# Patient Record
Sex: FEMALE | Race: WHITE | NOT HISPANIC OR LATINO | Employment: PART TIME | URBAN - METROPOLITAN AREA
[De-identification: names, ages, dates, MRNs, and addresses within clinical notes are randomized per-mention and may not be internally consistent; named-entity substitution may affect disease eponyms.]

---

## 2017-03-27 ENCOUNTER — OFFICE VISIT (OUTPATIENT)
Dept: URGENT CARE | Facility: URGENT CARE | Age: 52
End: 2017-03-27
Payer: COMMERCIAL

## 2017-03-27 ENCOUNTER — RADIANT APPOINTMENT (OUTPATIENT)
Dept: GENERAL RADIOLOGY | Facility: CLINIC | Age: 52
End: 2017-03-27
Attending: FAMILY MEDICINE
Payer: COMMERCIAL

## 2017-03-27 VITALS
HEART RATE: 88 BPM | SYSTOLIC BLOOD PRESSURE: 100 MMHG | WEIGHT: 139.3 LBS | TEMPERATURE: 98.2 F | BODY MASS INDEX: 28.12 KG/M2 | DIASTOLIC BLOOD PRESSURE: 64 MMHG | RESPIRATION RATE: 16 BRPM | OXYGEN SATURATION: 98 %

## 2017-03-27 DIAGNOSIS — R07.9 CHEST PAIN, UNSPECIFIED: ICD-10-CM

## 2017-03-27 DIAGNOSIS — J18.9 PNEUMONIA OF LEFT LOWER LOBE DUE TO INFECTIOUS ORGANISM: ICD-10-CM

## 2017-03-27 DIAGNOSIS — R05.9 COUGH: Primary | ICD-10-CM

## 2017-03-27 DIAGNOSIS — R51.9 NONINTRACTABLE HEADACHE, UNSPECIFIED CHRONICITY PATTERN, UNSPECIFIED HEADACHE TYPE: ICD-10-CM

## 2017-03-27 PROCEDURE — 71020 XR CHEST 2 VW: CPT

## 2017-03-27 PROCEDURE — 99214 OFFICE O/P EST MOD 30 MIN: CPT | Performed by: FAMILY MEDICINE

## 2017-03-27 RX ORDER — AZITHROMYCIN 250 MG/1
TABLET, FILM COATED ORAL
Qty: 6 TABLET | Refills: 0 | Status: SHIPPED | OUTPATIENT
Start: 2017-03-27 | End: 2018-07-18

## 2017-03-27 RX ORDER — CEFTRIAXONE 1 G/1
1000 INJECTION, POWDER, FOR SOLUTION INTRAMUSCULAR; INTRAVENOUS ONCE
Qty: 10 ML | Refills: 0
Start: 2017-03-27 | End: 2017-03-27

## 2017-03-27 NOTE — LETTER
[facility]   [address] [place] [address]   [phone]                March 27, 2017    RE:  Carlotta Arce                                                                                                                                                       [address] [facility] [address]            To whom it may concern:    Carlotta J Iván Arce is under my professional care for current illness. Carlotta  may return to work and resume regular physical activity.    Sincerely,        Aracely Beckham MD

## 2017-03-27 NOTE — NURSING NOTE
"Chief Complaint   Patient presents with     Cough     Cough and headache x 1 week      Diarrhea     x yesterday but not today        Initial /64 (BP Location: Left arm, Patient Position: Chair, Cuff Size: Adult Regular)  Pulse 88  Temp 98.2  F (36.8  C) (Oral)  Resp 16  Wt 139 lb 4.8 oz (63.2 kg)  SpO2 98%  BMI 28.12 kg/m2 Estimated body mass index is 28.12 kg/(m^2) as calculated from the following:    Height as of 8/31/14: 4' 11.02\" (1.499 m).    Weight as of this encounter: 139 lb 4.8 oz (63.2 kg).  Medication Reconciliation: complete    "

## 2017-03-27 NOTE — PROGRESS NOTES
Chief Complaint   Patient presents with     Cough     Cough and headache x 1 week      Diarrhea     x yesterday but not today      SUBJECTIVE:  Carlotta Arce is a 51 year old female who presents to the clinic today with a chief complaint of cough  and wheezing. for 1 week(s).  Her cough is described as persistent and productive of yellow sputum.    The patient's symptoms are moderate and not changing over the course of time.  Associated symptoms include chest pain with coughing . The patient's symptoms are exacerbated by no particular triggers  Patient has been using albuterol inhalor to improve symptoms.she has been smoking   She had one episode of diarrhea yesterday and also has been having chills too   Has chest pain in the center of the chest with coughing   She has been noticing headache with the coughing too  Denies any neurological symptoms with the headache     Past Medical History:   Diagnosis Date     Allergy, unspecified not elsewhere classified      Moderate persistent asthma      Other kyphoscoliosis and scoliosis      Tobacco use disorder        Current Outpatient Prescriptions   Medication Sig Dispense Refill     azithromycin (ZITHROMAX) 250 MG tablet Two tablets first day, then one tablet daily for four days. 6 tablet 0     cefTRIAXone (ROCEPHIN) 1 GM vial Inject 1 g (1,000 mg) into the muscle once for 1 dose 10 mL 0     IBUPROFEN PO        albuterol (2.5 MG/3ML) 0.083% nebulizer solution Take 1 vial (2.5 mg) by nebulization every 4 hours as needed for shortness of breath / dyspnea 1 Box 0     albuterol (ALBUTEROL) 108 (90 BASE) MCG/ACT inhaler Inhale 2 puffs into the lungs every 4 hours as needed for shortness of breath / dyspnea 1 Inhaler 0       Social History   Substance Use Topics     Smoking status: Current Every Day Smoker     Packs/day: 0.50     Years: 21.00     Types: Cigarettes     Smokeless tobacco: Never Used     Alcohol use Yes      Comment: 1-2x/wk       ROS  Review of systems  negative except as stated above.    OBJECTIVE:  /64 (BP Location: Left arm, Patient Position: Chair, Cuff Size: Adult Regular)  Pulse 88  Temp 98.2  F (36.8  C) (Oral)  Resp 16  Wt 139 lb 4.8 oz (63.2 kg)  SpO2 98%  BMI 28.12 kg/m2  GENERAL APPEARANCE: healthy, alert and no distress  EYES: EOMI,  PERRL, conjunctiva clear  HENT: ear canals and TM's normal.  Nose and mouth without ulcers, erythema or lesions  NECK: supple, nontender, no lymphadenopathy  RESP: good air entry positive for  rales, no rhonchi or wheezes  CV: regular rates and rhythm  ABDOMEN:  soft, nontender, no HSM or masses and bowel sounds normal  SKIN: no suspicious lesions or rashes    ASSESSMENT:   Carlotta was seen today for cough and diarrhea.    Diagnoses and all orders for this visit:    Cough  -     XR Chest 2 Views    Nonintractable headache, unspecified chronicity pattern, unspecified headache type    Chest pain, unspecified    Pneumonia of left lower lobe due to infectious organism  -     azithromycin (ZITHROMAX) 250 MG tablet; Two tablets first day, then one tablet daily for four days.  -     cefTRIAXone (ROCEPHIN) 1 GM vial; Inject 1 g (1,000 mg) into the muscle once for 1 dose        PLAN:  See orders in Epic  Symptomatic measures encouraged, humidified air, plenty of fluids.  Discussed with pt about the results  Advised to continue the meds   Discussed about smoking   She was advised to follow up with PCP in 2-3 days   Follow up if  symptoms fail to improve or worsens   Pt understood and agreed with plan

## 2017-03-27 NOTE — MR AVS SNAPSHOT
"              After Visit Summary   3/27/2017    Carlotta Arce    MRN: [id number]           Patient Information     Date Of Birth          1965        Visit Information        Provider Department      3/27/2017 5:45 PM Aracely Beckham MD [facility]        Today's Diagnoses     Cough    -  1    Nonintractable headache, unspecified chronicity pattern, unspecified headache type        Chest pain, unspecified        Pneumonia of left lower lobe due to infectious organism           Follow-ups after your visit        Who to contact     If you have questions or need follow up information about today's clinic visit or your schedule please contact [facility] directly at [phone].  Normal or non-critical lab and imaging results will be communicated to you by MyChart, letter or phone within 4 business days after the clinic has received the results. If you do not hear from us within 7 days, please contact the clinic through MyChart or phone. If you have a critical or abnormal lab result, we will notify you by phone as soon as possible.  Submit refill requests through airpim or call your pharmacy and they will forward the refill request to us. Please allow 3 business days for your refill to be completed.          Additional Information About Your Visit        MyChart Information     airpim lets you send messages to your doctor, view your test results, renew your prescriptions, schedule appointments and more. To sign up, go to [email or web address] . Click on \"Log in\" on the left side of the screen, which will take you to the Welcome page. Then click on \"Sign up Now\" on the right side of the page.     You will be asked to enter the access code listed below, as well as some personal information. Please follow the directions to create your username and password.     Your access code is: [email or web address]  Expires: 6/25/2017  8:44 PM     Your access code " will  in 90 days. If you need help or a new code, please call your [facility] or [phone].        Care EveryWhere ID     This is your Care EveryWhere ID. This could be used by other organizations to access your [place] medical records  [id number]        Your Vitals Were     Pulse Temperature Respirations Pulse Oximetry BMI (Body Mass Index)       88 98.2  F (36.8  C) (Oral) 16 98% 28.12 kg/m2        Blood Pressure from Last 3 Encounters:   17 100/64   04/12/15 98/64   14 114/63    Weight from Last 3 Encounters:   17 139 lb 4.8 oz (63.2 kg)   04/12/15 156 lb (70.8 kg)   14 151 lb (68.5 kg)              We Performed the Following     XR Chest 2 Views          Today's Medication Changes          These changes are accurate as of: 3/27/17  8:44 PM.  If you have any questions, ask your nurse or doctor.               Start taking these medicines.        Dose/Directions    azithromycin 250 MG tablet   Commonly known as:  ZITHROMAX   Used for:  Pneumonia of left lower lobe due to infectious organism   Started by:  Aracely Beckham MD        Two tablets first day, then one tablet daily for four days.   Quantity:  6 tablet   Refills:  0       cefTRIAXone 1 GM vial   Commonly known as:  ROCEPHIN   Used for:  Pneumonia of left lower lobe due to infectious organism   Started by:  Aracely Beckham MD        Dose:  1000 mg   Inject 1 g (1,000 mg) into the muscle once for 1 dose   Quantity:  10 mL   Refills:  0            Where to get your medicines      Some of these will need a paper prescription and others can be bought over the counter.  Ask your nurse if you have questions.     Bring a paper prescription for each of these medications     azithromycin 250 MG tablet       You don't need a prescription for these medications     cefTRIAXone 1 GM vial                Primary Care Provider Office Phone # Fax #    Casey Gottlieb [phone]       [facility] [address] [place] [address]        Thank you!     Thank you for choosing [facility]  for your care. Our goal is always to provide you with excellent care. Hearing back from our patients is one way we can continue to improve our services. Please take a few minutes to complete the written survey that you may receive in the mail after your visit with us. Thank you!             Your Updated Medication List - Protect others around you: Learn how to safely use, store and throw away your medicines at www.disposemymeds.org.          This list is accurate as of: 3/27/17  8:44 PM.  Always use your most recent med list.                   Brand Name Dispense Instructions for use    * albuterol 108 (90 BASE) MCG/ACT Inhaler    albuterol    1 Inhaler    Inhale 2 puffs into the lungs every 4 hours as needed for shortness of breath / dyspnea       * albuterol (2.5 MG/3ML) 0.083% neb solution     1 Box    Take 1 vial (2.5 mg) by nebulization every 4 hours as needed for shortness of breath / dyspnea       azithromycin 250 MG tablet    ZITHROMAX    6 tablet    Two tablets first day, then one tablet daily for four days.       cefTRIAXone 1 GM vial    ROCEPHIN    10 mL    Inject 1 g (1,000 mg) into the muscle once for 1 dose       IBUPROFEN PO          * Notice:  This list has 2 medication(s) that are the same as other medications prescribed for you. Read the directions carefully, and ask your doctor or other care provider to review them with you.

## 2017-03-28 NOTE — NURSING NOTE
Called patient and informed her to follow up with her PCP in 2-3 days pre providers request. Patient had no further questions.

## 2017-07-01 ENCOUNTER — HEALTH MAINTENANCE LETTER (OUTPATIENT)
Age: 52
End: 2017-07-01

## 2018-07-18 ENCOUNTER — OFFICE VISIT (OUTPATIENT)
Dept: URGENT CARE | Facility: URGENT CARE | Age: 53
End: 2018-07-18
Payer: COMMERCIAL

## 2018-07-18 VITALS
BODY MASS INDEX: 29.57 KG/M2 | RESPIRATION RATE: 18 BRPM | OXYGEN SATURATION: 94 % | WEIGHT: 146.5 LBS | TEMPERATURE: 97.9 F | HEART RATE: 85 BPM | SYSTOLIC BLOOD PRESSURE: 126 MMHG | DIASTOLIC BLOOD PRESSURE: 80 MMHG

## 2018-07-18 DIAGNOSIS — J45.41 MODERATE PERSISTENT ASTHMA WITH ACUTE EXACERBATION: Primary | ICD-10-CM

## 2018-07-18 PROCEDURE — 99214 OFFICE O/P EST MOD 30 MIN: CPT | Performed by: FAMILY MEDICINE

## 2018-07-18 RX ORDER — METHYLPREDNISOLONE 4 MG
4 TABLET, DOSE PACK ORAL SEE ADMIN INSTRUCTIONS
Qty: 21 TABLET | Refills: 0 | Status: SHIPPED | OUTPATIENT
Start: 2018-07-18 | End: 2019-02-05

## 2018-07-18 RX ORDER — AZITHROMYCIN 250 MG/1
TABLET, FILM COATED ORAL
Qty: 6 TABLET | Refills: 0 | Status: SHIPPED | OUTPATIENT
Start: 2018-07-18 | End: 2019-02-05

## 2018-07-18 NOTE — MR AVS SNAPSHOT
After Visit Summary   7/18/2018    Carlotta Arce    MRN: [id number]           Patient Information     Date Of Birth          1965        Visit Information        Provider Department      7/18/2018 1:20 PM Nida Hobson MD [facility]        Today's Diagnoses     Moderate persistent asthma with acute exacerbation    -  1      Care Instructions      Asthma (Adult)  Asthma is a disease where the medium and  small air passages within the lung go into spasm and restrict the flow of air. Inflammation and swelling of the airways cause further blockage. During an acute asthma attack, these factors cause trouble breathing, wheezing, cough and chest tightness.    An asthma attack can be triggered by many things. Common triggers include infections such as the common cold, bronchitis, and pneumonia. Irritants such as smoke or pollutants in the air, very cold air, emotional upset, and exercise can also trigger an attack. In many adults with asthma, allergies to dust, mold, pollen and animal dander can cause an asthma attack. Skipping doses of daily asthma medicine can also bring on an asthma attack.  Asthma can be controlled using the proper medicines prescribed by your healthcare provider and avoiding exposure to known triggers including allergens and irritants.  Home care    Take prescribed medicine exactly at the times advised. If you need medicine such as from a hand held inhaler or aerosol breathing machine more than every 4 hours, contact your healthcare provider or seek immediate medical attention. If prescribed an antibiotic or prednisone, take all of the medicine as prescribed, even if you are feeling better after a few days.    Don't smoke. Avoid being exposed to the smoke of others.    Some people with asthma have worsening of their symptoms when they take aspirin and non-steroidal or fever-reducing medicines like ibuprofen and naproxen. Talk to your  "healthcare provider if you think this may apply to you.  Follow-up care  Follow up with your healthcare provider, or as advised. Always bring all of your current medicines to any appointments with your healthcare provider. Also bring a complete list of medicines even those not taken for asthma. If you don't already have one, talk to your healthcare provider about developing your own \"Asthma Action Plan.\"  A pneumococcal (pneumonia) vaccine and yearly flu shot (every fall) are recommended. Ask your doctor about this.  When to seek medical advice  Call your healthcare provider right away if any of these occur:     Increased wheezing or shortness of breath    Need to use your inhalers more often than usual without relief    Fever of 100.4 F (38 C) or higher, or as directed by your healthcare provider    Coughing up lots of dark-colored or bloody sputum (mucus)    Chest pain with each breath    If you use a peak flow meter as part of an Asthma Action Plan, and you are still in the yellow zone (50% to 80%) 15 minutes after using inhaler medicine.  Call 911  Call 911 if any of the following occur    Trouble walking or talking because of shortness of breath    If you use a peak flow meter as part of an Asthma Action Plan and you are still in the red zone (less than 50%) 15 minutes after using inhaler medicine    Lips or fingernails turning gray or blue  Date Last Reviewed: 5/1/2017 2000-2017 [employer]. [address] [place] [address]. All rights reserved. This information is not intended as a substitute for professional medical care. Always follow your healthcare professional's instructions.                Follow-ups after your visit        Who to contact     If you have questions or need follow up information about today's clinic visit or your schedule please contact [place] [facility] directly at [phone].  Normal or non-critical lab and imaging results will be " "communicated to you by [employer], letter or phone within 4 business days after the clinic has received the results. If you do not hear from us within 7 days, please contact the clinic through [employer] or phone. If you have a critical or abnormal lab result, we will notify you by phone as soon as possible.  Submit refill requests through [employer] or call your pharmacy and they will forward the refill request to us. Please allow 3 business days for your refill to be completed.          Additional Information About Your Visit        Veduca Information     [employer]a lets you send messages to your doctor, view your test results, renew your prescriptions, schedule appointments and more. To sign up, go to [email or web address] . Click on \"Log in\" on the left side of the screen, which will take you to the Welcome page. Then click on \"Sign up Now\" on the right side of the page.     You will be asked to enter the access code listed below, as well as some personal information. Please follow the directions to create your username and password.     Your access code is: [email or web address]  Expires: 10/16/2018  2:05 PM     Your access code will  in 90 days. If you need help or a new code, please call your [place] clinic or [phone].        Care EveryWhere ID     This is your Care EveryWhere ID. This could be used by other organizations to access your [place] medical records  [id number]        Your Vitals Were     Pulse Temperature Respirations Pulse Oximetry BMI (Body Mass Index)       85 97.9  F (36.6  C) (Oral) 18 94% 29.57 kg/m2        Blood Pressure from Last 3 Encounters:   18 126/80   17 100/64   04/12/15 98/64    Weight from Last 3 Encounters:   18 146 lb 8 oz (66.5 kg)   17 139 lb 4.8 oz (63.2 kg)   04/12/15 156 lb (70.8 kg)              Today, you had the following     No orders found for display         Today's Medication Changes          These changes are accurate as of 18  2:05 PM.  If " you have any questions, ask your nurse or doctor.               Start taking these medicines.        Dose/Directions    methylPREDNISolone 4 MG tablet   Commonly known as:  MEDROL   Used for:  Moderate persistent asthma with acute exacerbation   Started by:  Nida Hobson MD        Dose:  4 mg   Take 1 tablet (4 mg) by mouth See Admin Instructions follow package directions   Quantity:  21 tablet   Refills:  0         These medicines have changed or have updated prescriptions.        Dose/Directions    azithromycin 250 MG tablet   Commonly known as:  ZITHROMAX   This may have changed:  additional instructions   Used for:  Moderate persistent asthma with acute exacerbation   Changed by:  Nida Hobson MD        2 tablets day 1 then 1 tablet daily for 4 days   Quantity:  6 tablet   Refills:  0         Stop taking these medicines if you haven't already. Please contact your care team if you have questions.     IBUPROFEN PO   Stopped by:  Nida Hobson MD                Where to get your medicines      These medications were sent to "[facility] [address] [place] [address] [address] [facility] [place] [address] [address] [facility] [address]     Phone:  [phone]     azithromycin 250 MG tablet    methylPREDNISolone 4 MG tablet                Primary Care Provider Office Phone # Fax #    Casey Gottlieb [phone]       [address] [address]        Equal Access to Services     ALVARO HERRING : Hadii fran patel hadasho Soomaali, waaxda luqadaha, qaybta kaalmada adeegyada, katie leija. So [facility] [phone].    ATENCIÓN: Si habla español, tiene a wolff disposición servicios gratuitos de asistencia lingüística. Llame al [phone].    We comply with applicable federal civil rights laws and [place] laws. We do not discriminate on the basis of race, color, national origin, age, disability, sex, sexual orientation, or  gender identity.            Thank you!     Thank you for choosing [facility]  for your care. Our goal is always to provide you with excellent care. Hearing back from our patients is one way we can continue to improve our services. Please take a few minutes to complete the written survey that you may receive in the mail after your visit with us. Thank you!             Your Updated Medication List - Protect others around you: Learn how to safely use, store and throw away your medicines at www.disposemymeds.org.          This list is accurate as of 7/18/18  2:05 PM.  Always use your most recent med list.                   Brand Name Dispense Instructions for use Diagnosis    * albuterol 108 (90 Base) MCG/ACT Inhaler    PROAIR HFA    1 Inhaler    Inhale 2 puffs into the lungs every 4 hours as needed for shortness of breath / dyspnea        * albuterol (2.5 MG/3ML) 0.083% neb solution     1 Box    Take 1 vial (2.5 mg) by nebulization every 4 hours as needed for shortness of breath / dyspnea    Moderate persistent asthma       azithromycin 250 MG tablet    ZITHROMAX    6 tablet    2 tablets day 1 then 1 tablet daily for 4 days    Moderate persistent asthma with acute exacerbation       methylPREDNISolone 4 MG tablet    MEDROL    21 tablet    Take 1 tablet (4 mg) by mouth See Admin Instructions follow package directions    Moderate persistent asthma with acute exacerbation       * Notice:  This list has 2 medication(s) that are the same as other medications prescribed for you. Read the directions carefully, and ask your doctor or other care provider to review them with you.

## 2018-07-18 NOTE — PATIENT INSTRUCTIONS
"  Asthma (Adult)  Asthma is a disease where the medium and  small air passages within the lung go into spasm and restrict the flow of air. Inflammation and swelling of the airways cause further blockage. During an acute asthma attack, these factors cause trouble breathing, wheezing, cough and chest tightness.    An asthma attack can be triggered by many things. Common triggers include infections such as the common cold, bronchitis, and pneumonia. Irritants such as smoke or pollutants in the air, very cold air, emotional upset, and exercise can also trigger an attack. In many adults with asthma, allergies to dust, mold, pollen and animal dander can cause an asthma attack. Skipping doses of daily asthma medicine can also bring on an asthma attack.  Asthma can be controlled using the proper medicines prescribed by your healthcare provider and avoiding exposure to known triggers including allergens and irritants.  Home care    Take prescribed medicine exactly at the times advised. If you need medicine such as from a hand held inhaler or aerosol breathing machine more than every 4 hours, contact your healthcare provider or seek immediate medical attention. If prescribed an antibiotic or prednisone, take all of the medicine as prescribed, even if you are feeling better after a few days.    Don't smoke. Avoid being exposed to the smoke of others.    Some people with asthma have worsening of their symptoms when they take aspirin and non-steroidal or fever-reducing medicines like ibuprofen and naproxen. Talk to your healthcare provider if you think this may apply to you.  Follow-up care  Follow up with your healthcare provider, or as advised. Always bring all of your current medicines to any appointments with your healthcare provider. Also bring a complete list of medicines even those not taken for asthma. If you don't already have one, talk to your healthcare provider about developing your own \"Asthma Action Plan.\"  A " pneumococcal (pneumonia) vaccine and yearly flu shot (every fall) are recommended. Ask your doctor about this.  When to seek medical advice  Call your healthcare provider right away if any of these occur:     Increased wheezing or shortness of breath    Need to use your inhalers more often than usual without relief    Fever of 100.4 F (38 C) or higher, or as directed by your healthcare provider    Coughing up lots of dark-colored or bloody sputum (mucus)    Chest pain with each breath    If you use a peak flow meter as part of an Asthma Action Plan, and you are still in the yellow zone (50% to 80%) 15 minutes after using inhaler medicine.  Call 911  Call 911 if any of the following occur    Trouble walking or talking because of shortness of breath    If you use a peak flow meter as part of an Asthma Action Plan and you are still in the red zone (less than 50%) 15 minutes after using inhaler medicine    Lips or fingernails turning gray or blue  Date Last Reviewed: 5/1/2017 2000-2017 [employer]. [address] [place] [address], [place] [address]. All rights reserved. This information is not intended as a substitute for professional medical care. Always follow your healthcare professional's instructions.

## 2018-07-18 NOTE — LETTER
[facility]  [address] [address]  [phone]      July 18, 2018    RE:  Carlotta Arce                                                                                                                                                       [address] [facility] [address]            To whom it may concern:    Carlotta Arce is under my professional care for Moderate persistent asthma with acute exacerbation.   She  may return to work with the following: No working or lifting restrictions on or about 7/19/2018.          Sincerely,        Nida Hobson MD  [facility]

## 2019-02-04 ENCOUNTER — NURSE TRIAGE (OUTPATIENT)
Dept: NURSING | Facility: CLINIC | Age: 54
End: 2019-02-04

## 2019-02-04 NOTE — TELEPHONE ENCOUNTER
"Carlotta says that she has had the diarrhea, since last Wednesday, 1/30/2019. Pt. Says that she vomited yesterday and has had abdominal pain, right below her belly button. Pain = 7/10. Pt. Will have her  take her to an ER tonight.    Reason for Disposition    [1] Vomiting AND [2] abdomen looks much more swollen than usual    Additional Information    Negative: Shock suspected (e.g., cold/pale/clammy skin, too weak to stand, low BP, rapid pulse)    Negative: Difficult to awaken or acting confused  (e.g., disoriented, slurred speech)    Negative: Passed out (i.e., lost consciousness, collapsed and was not responding)    Negative: Sounds like a life-threatening emergency to the triager    Negative: Chest pain    Negative: Pain is mainly in upper abdomen  (if needed ask: \"is it mainly above the belly button?\")    Negative: Followed an abdomen (stomach) injury    Negative: [1] Abdominal pain AND [2] pregnant < 20 weeks    Negative: [1] Abdominal pain AND [2] pregnant > 20 weeks    Negative: [1] Abdominal pain AND [2] postpartum < 1 month since delivery    Negative: [1] SEVERE pain (e.g., excruciating) AND [2] present > 1 hour    Negative: [1] SEVERE pain AND [2] age > 60    Negative: [1] Vomiting AND [2] contains red blood or black (\"coffee ground\") material  (Exception: few red streaks in vomit that only happened once)    Negative: Blood in bowel movements   (Exception: blood on surface of BM with constipation)    Negative: Black or tarry bowel movements  (Exception: chronic-unchanged  black-grey bowel movements AND is taking iron pills or Pepto-bismol)    Negative: Patient sounds very sick or weak to the triager    Negative: [1] MILD-MODERATE pain AND [2] constant AND [3] present > 2 hours    Protocols used: ABDOMINAL PAIN - FEMALE-ADULT-      "

## 2019-02-05 ENCOUNTER — HOSPITAL ENCOUNTER (EMERGENCY)
Facility: CLINIC | Age: 54
Discharge: HOME | End: 2019-02-05
Attending: EMERGENCY MEDICINE | Admitting: EMERGENCY MEDICINE
Payer: MEDICAID

## 2019-02-05 VITALS
WEIGHT: 155 LBS | DIASTOLIC BLOOD PRESSURE: 74 MMHG | TEMPERATURE: 98.2 F | HEIGHT: 59 IN | RESPIRATION RATE: 26 BRPM | BODY MASS INDEX: 31.25 KG/M2 | OXYGEN SATURATION: 97 % | SYSTOLIC BLOOD PRESSURE: 121 MMHG

## 2019-02-05 DIAGNOSIS — J45.901 EXACERBATION OF ASTHMA, UNSPECIFIED ASTHMA SEVERITY, UNSPECIFIED WHETHER PERSISTENT: ICD-10-CM

## 2019-02-05 DIAGNOSIS — K52.9 GASTROENTERITIS: ICD-10-CM

## 2019-02-05 LAB
ANION GAP SERPL CALCULATED.3IONS-SCNC: 7 MMOL/L (ref 3–14)
ANISOCYTOSIS BLD QL SMEAR: ABNORMAL
BASOPHILS # BLD AUTO: 0.1 10E9/L (ref 0–0.2)
BASOPHILS NFR BLD AUTO: 1.1 %
BUN SERPL-MCNC: 12 MG/DL (ref 7–30)
CALCIUM SERPL-MCNC: 8.8 MG/DL (ref 8.5–10.1)
CHLORIDE SERPL-SCNC: 105 MMOL/L (ref 94–109)
CO2 SERPL-SCNC: 30 MMOL/L (ref 20–32)
CREAT SERPL-MCNC: 0.51 MG/DL (ref 0.52–1.04)
DIFFERENTIAL METHOD BLD: ABNORMAL
EOSINOPHIL # BLD AUTO: 0.1 10E9/L (ref 0–0.7)
EOSINOPHIL NFR BLD AUTO: 2.2 %
ERYTHROCYTE [DISTWIDTH] IN BLOOD BY AUTOMATED COUNT: 18.9 % (ref 10–15)
GFR SERPL CREATININE-BSD FRML MDRD: >90 ML/MIN/{1.73_M2}
GLUCOSE SERPL-MCNC: 96 MG/DL (ref 70–99)
HCT VFR BLD AUTO: 30.4 % (ref 35–47)
HGB BLD-MCNC: 8.6 G/DL (ref 11.7–15.7)
HYPOCHROMIA BLD QL: PRESENT
IMM GRANULOCYTES # BLD: 0 10E9/L (ref 0–0.4)
IMM GRANULOCYTES NFR BLD: 0.5 %
LYMPHOCYTES # BLD AUTO: 2.2 10E9/L (ref 0.8–5.3)
LYMPHOCYTES NFR BLD AUTO: 34.7 %
MCH RBC QN AUTO: 18.9 PG (ref 26.5–33)
MCHC RBC AUTO-ENTMCNC: 28.3 G/DL (ref 31.5–36.5)
MCV RBC AUTO: 67 FL (ref 78–100)
MICROCYTES BLD QL SMEAR: PRESENT
MONOCYTES # BLD AUTO: 0.5 10E9/L (ref 0–1.3)
MONOCYTES NFR BLD AUTO: 8.3 %
NEUTROPHILS # BLD AUTO: 3.4 10E9/L (ref 1.6–8.3)
NEUTROPHILS NFR BLD AUTO: 53.2 %
NRBC # BLD AUTO: 0 10*3/UL
NRBC BLD AUTO-RTO: 0 /100
PLATELET # BLD AUTO: 492 10E9/L (ref 150–450)
PLATELET # BLD EST: ABNORMAL 10*3/UL
POTASSIUM SERPL-SCNC: 3.7 MMOL/L (ref 3.4–5.3)
RBC # BLD AUTO: 4.55 10E12/L (ref 3.8–5.2)
SODIUM SERPL-SCNC: 142 MMOL/L (ref 133–144)
WBC # BLD AUTO: 6.4 10E9/L (ref 4–11)

## 2019-02-05 PROCEDURE — 25000132 ZZH RX MED GY IP 250 OP 250 PS 637: Performed by: EMERGENCY MEDICINE

## 2019-02-05 PROCEDURE — 99284 EMERGENCY DEPT VISIT MOD MDM: CPT | Mod: 25

## 2019-02-05 PROCEDURE — 80048 BASIC METABOLIC PNL TOTAL CA: CPT | Performed by: EMERGENCY MEDICINE

## 2019-02-05 PROCEDURE — 25000128 H RX IP 250 OP 636: Performed by: EMERGENCY MEDICINE

## 2019-02-05 PROCEDURE — 96374 THER/PROPH/DIAG INJ IV PUSH: CPT

## 2019-02-05 PROCEDURE — 85025 COMPLETE CBC W/AUTO DIFF WBC: CPT | Performed by: EMERGENCY MEDICINE

## 2019-02-05 PROCEDURE — 96361 HYDRATE IV INFUSION ADD-ON: CPT

## 2019-02-05 PROCEDURE — 25000125 ZZHC RX 250: Performed by: EMERGENCY MEDICINE

## 2019-02-05 RX ORDER — ONDANSETRON 4 MG/1
4 TABLET, ORALLY DISINTEGRATING ORAL EVERY 8 HOURS PRN
Qty: 10 TABLET | Refills: 0 | Status: SHIPPED | OUTPATIENT
Start: 2019-02-05 | End: 2019-02-08

## 2019-02-05 RX ORDER — LOPERAMIDE HYDROCHLORIDE 2 MG/1
2 TABLET ORAL 4 TIMES DAILY PRN
Qty: 30 TABLET | Refills: 0 | Status: SHIPPED | OUTPATIENT
Start: 2019-02-05 | End: 2019-03-07

## 2019-02-05 RX ORDER — DIPHENOXYLATE HCL/ATROPINE 2.5-.025MG
2 TABLET ORAL ONCE
Status: COMPLETED | OUTPATIENT
Start: 2019-02-05 | End: 2019-02-05

## 2019-02-05 RX ORDER — PREDNISONE 20 MG/1
TABLET ORAL
Qty: 9 TABLET | Refills: 0 | Status: SHIPPED | OUTPATIENT
Start: 2019-02-05 | End: 2019-02-15

## 2019-02-05 RX ORDER — SODIUM CHLORIDE, SODIUM LACTATE, POTASSIUM CHLORIDE, CALCIUM CHLORIDE 600; 310; 30; 20 MG/100ML; MG/100ML; MG/100ML; MG/100ML
1000 INJECTION, SOLUTION INTRAVENOUS CONTINUOUS
Status: DISCONTINUED | OUTPATIENT
Start: 2019-02-05 | End: 2019-02-05 | Stop reason: HOSPADM

## 2019-02-05 RX ORDER — IPRATROPIUM BROMIDE AND ALBUTEROL SULFATE 2.5; .5 MG/3ML; MG/3ML
3 SOLUTION RESPIRATORY (INHALATION) ONCE
Status: COMPLETED | OUTPATIENT
Start: 2019-02-05 | End: 2019-02-05

## 2019-02-05 RX ORDER — ONDANSETRON 2 MG/ML
4 INJECTION INTRAMUSCULAR; INTRAVENOUS
Status: COMPLETED | OUTPATIENT
Start: 2019-02-05 | End: 2019-02-05

## 2019-02-05 RX ADMIN — IPRATROPIUM BROMIDE AND ALBUTEROL SULFATE 3 ML: .5; 3 SOLUTION RESPIRATORY (INHALATION) at 13:29

## 2019-02-05 RX ADMIN — DIPHENOXYLATE HYDROCHLORIDE AND ATROPINE SULFATE 2 TABLET: 2.5; .025 TABLET ORAL at 17:06

## 2019-02-05 RX ADMIN — SODIUM CHLORIDE, POTASSIUM CHLORIDE, SODIUM LACTATE AND CALCIUM CHLORIDE 1000 ML: 600; 310; 30; 20 INJECTION, SOLUTION INTRAVENOUS at 17:06

## 2019-02-05 RX ADMIN — ONDANSETRON 4 MG: 2 INJECTION INTRAMUSCULAR; INTRAVENOUS at 17:05

## 2019-02-05 ASSESSMENT — ENCOUNTER SYMPTOMS
ABDOMINAL PAIN: 1
DIARRHEA: 1
VOMITING: 1
BLOOD IN STOOL: 0
WHEEZING: 1
WEAKNESS: 1
FEVER: 0
NAUSEA: 1

## 2019-02-05 ASSESSMENT — MIFFLIN-ST. JEOR: SCORE: 1223.71

## 2019-02-05 NOTE — ED PROVIDER NOTES
"  History     Chief Complaint:  Vomiting & Diarrhea    HPI   Carlotta Arce is a 53 year old female who presents for evaluation of diarrhea and vomiting. She reports 6 days of symptoms and has had 8-9 episodes of diarrhea each day. She reports fewer episodes of emesis, and is more concerned for the diarrhea. She reports waiting this long to present because she does not have insurance, however she reached the point where she has not been able to eat or drink anything for days and had to present to the ED today. Here, she also reports abdominal pain and possible hematemesis this morning, but denies any blood in her stool. She also feels dizzy when she stands up. She reports feeling warm, but her temperature never superceded 99 degrees. She denies any sick contacts or travel out of the country. Secondary to her GI symptoms, she reports wheezing that started this morning. She has a history of asthma and used 2 nebulizer treatments at home with minimal relief. She has been admitted to the hospital in the past for asthma exacerbation.     Allergies:  NKDA    Medications:    Albuterol inhaler    Past Medical History:    Asthma  Tobacco use disorder    Past Surgical History:    Cholecystectomy  C section x3  Tubal ligation x2    Family History:    Asthma  Lung cancer    Social History:  Marital Status:   [2]  Smokes 0.5 PPD  Positive for alcohol use. Comment 1-2 drinks/week     Review of Systems   Constitutional: Negative for fever.   Respiratory: Positive for wheezing.    Gastrointestinal: Positive for abdominal pain, diarrhea, nausea and vomiting. Negative for blood in stool.   Neurological: Positive for weakness.   All other systems reviewed and are negative.    Physical Exam     Patient Vitals for the past 24 hrs:   BP Temp Temp src Heart Rate Resp SpO2 Height Weight   02/05/19 1323 121/74 98.2  F (36.8  C) Oral 77 26 97 % 1.499 m (4' 11\") 70.3 kg (155 lb)     Physical Exam  General: Patient is alert and " cooperative.  HENT:  Dry lips and oral mucosa;  Poor dentition.  Eyes: EOMI. Normal conjunctiva.  Neck:  Normal range of motion and appearance.   Cardiovascular:  Normal rate, regular rhythm and normal heart sounds.   Pulmonary/Chest:  Bilateral expiratory wheezing and rhonchi.   Abdominal: Soft. No distension or tenderness.     Musculoskeletal: Normal range of motion. No edema or tenderness.   Neurological: oriented, normal strength, sensation, and coordination.   Skin: Warm and dry. No rash or bruising.   Psychiatric: Normal mood and affect. Normal behavior and judgement.      Emergency Department Course   Laboratory:  CBC: WBC: 6.4, HGB: 8.6 (L), PLT: 492 (H)  BMP: AllWNL (Creatinine: 0.51 (L))    Enteric Bacteria and Virus Panel Stool: did not provide  Clostridium difficile toxin B PCR: did not provide    Interventions:  1329 Albuterol-Ipratropium 2.5mg-0.5mg/3ml, inhalation solution, Nebulizer  1705 Zofran, 4 mg, IV injection  1706 LR, 1L< IV   Lomotil, 2 tablets, PO    Emergency Department Course:  Nursing notes and vitals reviewed.   (1648) I performed an exam of the patient as documented above.      IV inserted. Medicine administered as documented above. Blood drawn. This was sent to the lab for further testing, results above.     (1624) Per nursing, the patient has tolerated crackers and fluids without return of symptoms.     (1909) I rechecked the patient and discussed the results of her workup thus far. She feels improved.      Findings and plan explained to the Patient. Patient discharged home with instructions regarding supportive care, medications, and reasons to return. The importance of close follow-up was reviewed. The patient was prescribed imodium, Zofran, and prednisone.      I personally reviewed the laboratory results with the Patient and answered all related questions prior to discharge.     Impression & Plan    Medical Decision Making:  Carlotta Arce is a 53 year old female has  presented with acute vomiting and non bloody diarrhea, with associated intermittent abdominal cramps.  No ill contacts or recent travel history, no recent antibiotic usage.  No insurance or recent primary care.  Does have longstanding history of asthma, reports recent mild exacerbation.  Exam notable for mild expiratory wheezing, normal rate and sats.  Benign abdomen.  Improved with IV hydration and anti emetic.  Labs reveal normal renal function and electrolytes, microcytic anemia, which appears to be chronic, based on review of old labs, none recent.  Unable to provide stool sample for testing, which I suggested, given duration of symptoms.  Hopeful self limited infectious gastroenteritis, symptomatic treatment recommended, no indication for empiric antibiotics.  Corticosteroid burst prescribed for mild asthma exacerbation, rec  follow-up next available for re-check and to address ongoing anemia.    Critical Care time:  none    Diagnosis:    ICD-10-CM    1. Gastroenteritis K52.9    2. Exacerbation of asthma, unspecified asthma severity, unspecified whether persistent J45.901        Disposition:  discharged to home    Discharge Medications:     Medication List      Started    loperamide 2 MG tablet  Commonly known as:  IMODIUM A-D  2 mg, Oral, 4 TIMES DAILY PRN     ondansetron 4 MG ODT tab  Commonly known as:  ZOFRAN ODT  4 mg, Oral, EVERY 8 HOURS PRN     predniSONE 20 MG tablet  Commonly known as:  DELTASONE  2 tabs day 1-2, then 1 tab days 3-4, then 1/2 tab daily for 6 days          Scribe Disclosure:  I, Merissa Bocanegra, am serving as a scribe on 2/5/2019 at 4:48 PM to personally document services performed by Anand Stafford MD based on my observations and the provider's statements to me.     Merissa Bocanegra  2/5/2019   [facility] EMERGENCY DEPARTMENT       Anand Stafford MD  02/07/19 3791

## 2019-02-06 NOTE — DISCHARGE INSTRUCTIONS
Discharge Instructions  Asthma    Asthma is a condition causing narrowing and inflammation of the airways that can make it hard to breathe.  Asthma can also cause cough, wheezing, noisy breathing and tightness in the chest.  Asthma can be brought on or ?triggered? by many things, including dust, mold, pollen, cigarette smoke, exercise, stress and infections (like the common cold).     Generally, every Emergency Department visit should have a follow-up clinic visit with either a primary or a specialty clinic/provider. Please follow-up as instructed by your emergency provider today.    Return to the Emergency Department if:  Your breathing gets worse.  You need to use your inhaler more often than every 4 hours, or cannot get relief from your inhaler.  You are very weak, or feel much more ill.  You develop new symptoms, such as chest pain.  You cough up blood.  You are vomiting (throwing up) enough that you cannot keep fluids or your medicine down.    What can I do to help myself?  Fill any prescriptions the provider gave you and take them right away--especially antibiotics. Be sure to finish the whole antibiotic prescription.  You may be given a prescription for an inhaler, which can help loosen tight air passages.  Use this as needed, but not more often than directed. Inhalers work much better when used with a spacer.   You may be given a prescription for a steroid to reduce inflammation. Used long-term, these can have some side effects, but for short-term use they are safe. You may notice restlessness or increased appetite (eating more).      You may use non-prescription cough or cold medicines. Cough medicines may help, but do not make the cough go away completely.   Avoid smoke, because this can make you feel worse. If you smoke, this may be a good time to quit! Consider using nicotine lozenges, gum, or patches to reduce cravings.   If you have a fever, Tylenol  (acetaminophen), Motrin  (ibuprofen), or Advil   (ibuprofen), may help bring fever down and may help you feel more comfortable. Be sure to read and follow the package directions, and ask your provider if you have questions.  Be sure to get your flu shot each year.  For certain age groups, the pneumonia shot can help prevent pneumonia.  It is important that you follow up with your regular provider, to be sure that you are improving from this spell (an acute asthma exacerbation), and also to do what you can to keep from having trouble again. Sometimes you need long-term medicines to keep your asthma under control.   If you were given a prescription for medicine here today, be sure to read all of the information (including the package insert) that comes with your prescription.  This will include important information about the medicine, its side effects, and any warnings that you need to know about.  The pharmacist who fills the prescription can provide more information and answer questions you may have about the medicine.  If you have questions or concerns that the pharmacist cannot address, please call or return to the Emergency Department.   Remember that you can always come back to the Emergency Department if you are not able to see your regular provider in the amount of time listed above, if you get any new symptoms, or if there is anything that worries you.  Discharge Instructions  Gastroenteritis    You have been seen today for vomiting (throwing up) and diarrhea (loose stools), called gastroenteritis or the stomach flu. This is usually caused by a virus, but some bacteria, parasites, medicines or other medical conditions can cause similar symptoms. At this time your provider does not find that your vomiting and diarrhea is a sign of anything dangerous or life-threatening.  However, sometimes the signs of serious illness do not show up right away. Remember that serious problems like appendicitis can look like gastroenteritis at first.       Generally, every  Emergency Department visit should have a follow-up clinic visit with either a primary or a specialty clinic/provider. Please follow-up as instructed by your emergency provider today.    Return to the Emergency Department if:  You keep vomiting and you are not able to keep liquids down.  You feel you are getting dehydrated, such as being very thirsty, not urinating (peeing), or feeling faint or lightheaded.   You develop a new fever.  You have abdominal (belly) pain that seems worse than cramps, is in one spot, or is getting worse over time.   You have blood in your vomit or in your diarrhea.  You feel very weak.    What can I do to help myself?  The most important thing to do is to drink clear liquids.  If you have been vomiting a lot, it is best to have only small, frequent sips of liquids.  Drinking too much at once may cause more vomiting. Water is a good first option for rehydration. If you are vomiting often, you must also replace electrolytes (salts and minerals) lost with your illness. Pedialyte  is the best rehydration liquid but many don?t like the taste so sports drinks (like Gatorade ) are a good option. Sodas and juice are also options but are high in sugar. Avoid acid liquids (orange), caffeine (coffee) or alcohol. Do not drink milk until you no longer have diarrhea.  After liquids are staying down, you may start eating mild foods. Soda crackers, toast, plain noodles, gelatin, applesauce and bananas are good first choices.  Avoid foods that have acid, are spicy, fatty or fibrous (such as meats, coarse grains, vegetables). You may start eating these foods again in about 3 days when you are better.  Sometimes treatment includes prescription medicine to prevent nausea (sick to your stomach) and vomiting and to prevent diarrhea. If your provider prescribes these for you, take them as directed.  Nonprescription medicine is available for the treatment of diarrhea and can be very effective.  If you use it,  make sure you use the dose recommended on the package. Avoid Lomotil . Check with your healthcare provider before you use any medicine for diarrhea.  Do not take ibuprofen, or other nonsteroidal anti-inflammatory medicines without checking with your healthcare provider.  If you were given a prescription for medicine here today, be sure to read all of the information (including the package insert) that comes with your prescription.  This will include important information about the medicine, its side effects, and any warnings that you need to know about.  The pharmacist who fills the prescription can provide more information and answer questions you may have about the medicine.  If you have questions or concerns that the pharmacist cannot address, please call or return to the Emergency Department.   Remember that you can always come back to the Emergency Department if you are not able to see your regular provider in the amount of time listed above, if you get any new symptoms, or if there is anything that worries you.

## 2019-02-06 NOTE — ED NOTES
Pt has eaten crackers, jello and drank jello with out difficulty. Pt felt like she was going to have a stool, pt got up to bathroom and only had gas. Pt able to void without difficulty.

## 2019-02-18 ENCOUNTER — APPOINTMENT (OUTPATIENT)
Dept: GENERAL RADIOLOGY | Facility: CLINIC | Age: 54
End: 2019-02-18
Attending: NURSE PRACTITIONER
Payer: MEDICAID

## 2019-02-18 ENCOUNTER — HOSPITAL ENCOUNTER (EMERGENCY)
Facility: CLINIC | Age: 54
Discharge: HOME | End: 2019-02-18
Attending: NURSE PRACTITIONER | Admitting: NURSE PRACTITIONER
Payer: MEDICAID

## 2019-02-18 VITALS
OXYGEN SATURATION: 99 % | RESPIRATION RATE: 20 BRPM | SYSTOLIC BLOOD PRESSURE: 150 MMHG | TEMPERATURE: 97.4 F | DIASTOLIC BLOOD PRESSURE: 90 MMHG

## 2019-02-18 DIAGNOSIS — H01.006 BLEPHARITIS OF LEFT EYE: ICD-10-CM

## 2019-02-18 DIAGNOSIS — J45.901 ASTHMA EXACERBATION: ICD-10-CM

## 2019-02-18 PROCEDURE — 71046 X-RAY EXAM CHEST 2 VIEWS: CPT

## 2019-02-18 PROCEDURE — 94640 AIRWAY INHALATION TREATMENT: CPT

## 2019-02-18 PROCEDURE — 99284 EMERGENCY DEPT VISIT MOD MDM: CPT | Mod: 25

## 2019-02-18 PROCEDURE — 25000125 ZZHC RX 250: Performed by: NURSE PRACTITIONER

## 2019-02-18 RX ORDER — PREDNISONE 20 MG/1
TABLET ORAL
Qty: 10 TABLET | Refills: 0 | Status: SHIPPED | OUTPATIENT
Start: 2019-02-18 | End: 2019-02-25

## 2019-02-18 RX ORDER — ALBUTEROL SULFATE 90 UG/1
2 AEROSOL, METERED RESPIRATORY (INHALATION) EVERY 6 HOURS PRN
Qty: 1 INHALER | Refills: 0 | Status: SHIPPED | OUTPATIENT
Start: 2019-02-18 | End: 2019-11-08

## 2019-02-18 RX ORDER — IPRATROPIUM BROMIDE AND ALBUTEROL SULFATE 2.5; .5 MG/3ML; MG/3ML
3 SOLUTION RESPIRATORY (INHALATION) ONCE
Status: COMPLETED | OUTPATIENT
Start: 2019-02-18 | End: 2019-02-18

## 2019-02-18 RX ORDER — PREDNISONE 20 MG/1
60 TABLET ORAL ONCE
Status: COMPLETED | OUTPATIENT
Start: 2019-02-18 | End: 2019-02-18

## 2019-02-18 RX ADMIN — IPRATROPIUM BROMIDE AND ALBUTEROL SULFATE 3 ML: .5; 3 SOLUTION RESPIRATORY (INHALATION) at 15:43

## 2019-02-18 RX ADMIN — IPRATROPIUM BROMIDE AND ALBUTEROL SULFATE 3 ML: .5; 3 SOLUTION RESPIRATORY (INHALATION) at 16:09

## 2019-02-18 RX ADMIN — PREDNISONE 60 MG: 20 TABLET ORAL at 15:41

## 2019-02-18 ASSESSMENT — ENCOUNTER SYMPTOMS
FEVER: 0
WHEEZING: 1
SHORTNESS OF BREATH: 1
EYE DISCHARGE: 0
VOMITING: 0
NAUSEA: 0
CHEST TIGHTNESS: 1
EYE PAIN: 1
COUGH: 1

## 2019-02-18 NOTE — DISCHARGE INSTRUCTIONS
Discharge Instructions  Asthma    Asthma is a condition causing narrowing and inflammation of the airways that can make it hard to breathe.  Asthma can also cause cough, wheezing, noisy breathing and tightness in the chest.  Asthma can be brought on or ?triggered? by many things, including dust, mold, pollen, cigarette smoke, exercise, stress and infections (like the common cold).     Generally, every Emergency Department visit should have a follow-up clinic visit with either a primary or a specialty clinic/provider. Please follow-up as instructed by your emergency provider today.    Return to the Emergency Department if:  Your breathing gets worse.  You need to use your inhaler more often than every 4 hours, or cannot get relief from your inhaler.  You are very weak, or feel much more ill.  You develop new symptoms, such as chest pain.  You cough up blood.  You are vomiting (throwing up) enough that you cannot keep fluids or your medicine down.    What can I do to help myself?  Fill any prescriptions the provider gave you and take them right away--especially antibiotics. Be sure to finish the whole antibiotic prescription.  You may be given a prescription for an inhaler, which can help loosen tight air passages.  Use this as needed, but not more often than directed. Inhalers work much better when used with a spacer.   You may be given a prescription for a steroid to reduce inflammation. Used long-term, these can have some side effects, but for short-term use they are safe. You may notice restlessness or increased appetite (eating more).      You may use non-prescription cough or cold medicines. Cough medicines may help, but do not make the cough go away completely.   Avoid smoke, because this can make you feel worse. If you smoke, this may be a good time to quit! Consider using nicotine lozenges, gum, or patches to reduce cravings.   If you have a fever, Tylenol  (acetaminophen), Motrin  (ibuprofen), or Advil   (ibuprofen), may help bring fever down and may help you feel more comfortable. Be sure to read and follow the package directions, and ask your provider if you have questions.  Be sure to get your flu shot each year.  For certain age groups, the pneumonia shot can help prevent pneumonia.  It is important that you follow up with your regular provider, to be sure that you are improving from this spell (an acute asthma exacerbation), and also to do what you can to keep from having trouble again. Sometimes you need long-term medicines to keep your asthma under control.   If you were given a prescription for medicine here today, be sure to read all of the information (including the package insert) that comes with your prescription.  This will include important information about the medicine, its side effects, and any warnings that you need to know about.  The pharmacist who fills the prescription can provide more information and answer questions you may have about the medicine.  If you have questions or concerns that the pharmacist cannot address, please call or return to the Emergency Department.   Remember that you can always come back to the Emergency Department if you are not able to see your regular provider in the amount of time listed above, if you get any new symptoms, or if there is anything that worries you.

## 2019-02-18 NOTE — ED AVS SNAPSHOT
[facility] Emergency Department  [address]  [facility] [address]  Phone:  [phone]  Fax:  [phone]                                    Carlotta Arce   MRN: [id number]    Department:  [facility] Emergency Department   Date of Visit:  2/18/2019           After Visit Summary Signature Page    I have received my discharge instructions, and my questions have been answered. I have discussed any challenges I see with this plan with the nurse or doctor.    ..........................................................................................................................................  Patient/Patient Representative Signature      ..........................................................................................................................................  Patient Representative Print Name and Relationship to Patient    ..................................................               ................................................  Date                                   Time    ..........................................................................................................................................  Reviewed by Signature/Title    ...................................................              ..............................................  Date                                               Time          22EPIC Rev 08/18

## 2019-02-18 NOTE — ED PROVIDER NOTES
History     Chief Complaint:  Eye Pain & Shortness of Breath      HPI   Carlotta Arce is a 53 year old female, with a history of asthma and daily smoker, who presents with her  to the ED for evaluation of left eye pain and shortness of breath. The patient reports she woke up with eye corner swelling this morning on her upper lid. She has associated pain. The patient denies any new lotions/makeup, eye drainage, visual disturbance. The patient notes she developed a cough 3 weeks ago with fever and body aches. She states fevers and body aches have resolved but she been feeling short of breath with wheezing and chest tightness since. She has been using her Albuterol inhaler x3/day with temporary alleviation of symptoms. She denies chest pain, palpations, dizziness, lightheadedness.      Allergies:  No known drug allergies    Medications:    Albuterol inhaler     Past Medical History:   Asthma  Kyphoscoliosis    Past Surgical History:    Tubal ligation x2   x3  Cholecystectomy    Family History:    Cancer  Asthma    Social History:  Smoking status: Current every day smoker: 0.5 ppd  Alcohol use: Yes  Presents to ED with       Review of Systems   Constitutional: Negative for fever.   Eyes: Positive for pain. Negative for discharge and visual disturbance.   Respiratory: Positive for cough, chest tightness, shortness of breath and wheezing.    Gastrointestinal: Negative for nausea and vomiting.   All other systems reviewed and are negative.    Physical Exam     Patient Vitals for the past 24 hrs:   BP Temp Temp src Heart Rate Resp SpO2   19 1522 150/90 97.4  F (36.3  C) Oral 78 20 99 %     Physical Exam  Constitutional: Alert, attentive, GCS 15.    HENT:  Oropharynx  is clear without erythema or exudates, mucous membranes are moist.   Ears: TMs gray, translucent, with normal light reflex; landmarks present.  External canals normal.  Eyes: EOM are normal. Pupils are equal, round, and  reactive to light. Left eye: conjunctiva pink, no scleral icterus or conjunctival injection; no drainage. Mild edema to left upper lash line with dry skin. No erythema or heat. No obvious stye or hordeolum. Right eye normal  CV: regular rate and rhythm; no murmurs, rubs or gallups.  Cap refill <3 seconds.  Respiratory: Effort normal. Diffuse inspiratory and expiratory wheeze. Good air exchange  Neurological: Alert, attentive.  Skin: Skin is warm and dry.  No rashes or petechiae.  Psychiatric: Normal affect.    Emergency Department Course     Imaging:  Radiographic findings were communicated with the patient and family who voiced understanding of the findings.    Chest XR, PA & LAT  IMPRESSION: No acute cardiopulmonary abnormality. As read by Radiology.     Interventions:  1541: Prednisone 60mg PO  1543: Duoneb 3mLs Neb   1609: Duoneb 3mLs Neb     Emergency Department Course:  Past medical records, nursing notes, and vitals reviewed.  1530: I performed an exam of the patient and obtained history, as documented above.    Patient was provided medications as noted above.    The patient was sent for a chest x-ray while in the emergency department, findings above.    1625: I rechecked the patient. Explained findings to patient and .    Findings and plan explained to the Patient and spouse. Patient discharged home with instructions regarding supportive care, medications, and reasons to return. The importance of close follow-up was reviewed.     Impression & Plan      Medical Decision Making:  Carlotta Arce is a 53 year old female with a PMH of asthma who presents for evaluation of shortness of breath and wheezing.  Signs and symptoms are consistent with asthma exacerbation.  A broad differential was considered including foreign body, asthma, pneumonia, bronchitis, reactive airway disease, pneumothorax, cardiac equivalent, viral induced wheezing, allergic phenomena, etc.  She feels improved after  interventions here in ED.  There are no signs at this point of any serious etiologies including those mentioned above.  Her breath sounds were much improved after 2 DuoNeb's.  She reports great improvement in shortness of breath.  No indication for hospitalization at this time including no hypoxia, no marked increase in respiratory rate, minimal to no retractions.  In regards to eye, exam consistent with blepharitis.  There is no evidence of conjunctivitis, stye, hordeolum, cellulitis or further acute eye process.  Discussed warm compresses and hygiene.  Updated patient if no improvement in the next week she should follow-up with PCP for discussion of topical antibiotics.  Supportive outpatient management is indicated, medications for discharge noted below  Close followup with primary care physician.  Return if increased wheezing, progressive shortness of breath, fevers, or any further concerns.      Diagnosis:    ICD-10-CM   1. Blepharitis of left eye H01.006   2. Asthma exacerbation J45.901     Disposition: Patient discharged to home with       Discharge Medications:  albuterol 108 (90 Base) MCG/ACT inhaler  Commonly known as:  PROAIR HFA/PROVENTIL HFA/VENTOLIN HFA  2 puffs, Inhalation, EVERY 6 HOURS PRN  What changed:      when to take this    reasons to take this    Another medication with the same name was removed. Continue taking this medication, and follow the directions you see here.     predniSONE 20 MG tablet  Commonly known as:  DELTASONE  2 tabs day 1-2, then 1 tab days 3-4, then 1/2 tab daily for 6 days  Ask about: Should I take this medication?          Radha Mane   2/18/2019   [facility] EMERGENCY DEPARTMENT    Scribe Disclosure:  I, Radha Mane, am serving as a scribe at 3:30 PM on 2/18/2019 to document services personally performed by Carolyn Reyonso APRN CNP based on my observations and the provider's statements to me.        Carolyn Reynoso APRN CNP  02/18/19  1256

## 2019-02-25 ENCOUNTER — NURSE TRIAGE (OUTPATIENT)
Dept: NURSING | Facility: CLINIC | Age: 54
End: 2019-02-25

## 2019-02-25 ENCOUNTER — HOSPITAL ENCOUNTER (EMERGENCY)
Facility: CLINIC | Age: 54
Discharge: HOME | End: 2019-02-25
Attending: EMERGENCY MEDICINE | Admitting: EMERGENCY MEDICINE
Payer: MEDICAID

## 2019-02-25 ENCOUNTER — APPOINTMENT (OUTPATIENT)
Dept: GENERAL RADIOLOGY | Facility: CLINIC | Age: 54
End: 2019-02-25
Attending: EMERGENCY MEDICINE
Payer: MEDICAID

## 2019-02-25 VITALS
WEIGHT: 154 LBS | SYSTOLIC BLOOD PRESSURE: 119 MMHG | DIASTOLIC BLOOD PRESSURE: 81 MMHG | RESPIRATION RATE: 9 BRPM | TEMPERATURE: 98.6 F | BODY MASS INDEX: 31.1 KG/M2 | OXYGEN SATURATION: 99 % | HEART RATE: 80 BPM

## 2019-02-25 DIAGNOSIS — R06.00 DYSPNEA, UNSPECIFIED TYPE: ICD-10-CM

## 2019-02-25 DIAGNOSIS — J40 BRONCHITIS: ICD-10-CM

## 2019-02-25 DIAGNOSIS — J45.40 MODERATE PERSISTENT ASTHMA: ICD-10-CM

## 2019-02-25 DIAGNOSIS — D50.9 IRON DEFICIENCY ANEMIA, UNSPECIFIED IRON DEFICIENCY ANEMIA TYPE: ICD-10-CM

## 2019-02-25 LAB
ANION GAP SERPL CALCULATED.3IONS-SCNC: 4 MMOL/L (ref 3–14)
BASOPHILS # BLD AUTO: 0.1 10E9/L (ref 0–0.2)
BASOPHILS NFR BLD AUTO: 0.8 %
BUN SERPL-MCNC: 15 MG/DL (ref 7–30)
CALCIUM SERPL-MCNC: 8.2 MG/DL (ref 8.5–10.1)
CHLORIDE SERPL-SCNC: 106 MMOL/L (ref 94–109)
CO2 SERPL-SCNC: 28 MMOL/L (ref 20–32)
CREAT SERPL-MCNC: 0.53 MG/DL (ref 0.52–1.04)
DIFFERENTIAL METHOD BLD: ABNORMAL
EOSINOPHIL # BLD AUTO: 0.3 10E9/L (ref 0–0.7)
EOSINOPHIL NFR BLD AUTO: 3.6 %
ERYTHROCYTE [DISTWIDTH] IN BLOOD BY AUTOMATED COUNT: 19.5 % (ref 10–15)
FERRITIN SERPL-MCNC: 4 NG/ML (ref 8–252)
FLUAV+FLUBV AG SPEC QL: NEGATIVE
FLUAV+FLUBV AG SPEC QL: NEGATIVE
GFR SERPL CREATININE-BSD FRML MDRD: >90 ML/MIN/{1.73_M2}
GLUCOSE SERPL-MCNC: 83 MG/DL (ref 70–99)
HCT VFR BLD AUTO: 28.1 % (ref 35–47)
HGB BLD-MCNC: 7.7 G/DL (ref 11.7–15.7)
IMM GRANULOCYTES # BLD: 0 10E9/L (ref 0–0.4)
IMM GRANULOCYTES NFR BLD: 0.5 %
IRON SATN MFR SERPL: 3 % (ref 15–46)
IRON SERPL-MCNC: 11 UG/DL (ref 35–180)
LYMPHOCYTES # BLD AUTO: 2.3 10E9/L (ref 0.8–5.3)
LYMPHOCYTES NFR BLD AUTO: 25.9 %
MCH RBC QN AUTO: 18.6 PG (ref 26.5–33)
MCHC RBC AUTO-ENTMCNC: 27.4 G/DL (ref 31.5–36.5)
MCV RBC AUTO: 68 FL (ref 78–100)
MONOCYTES # BLD AUTO: 0.8 10E9/L (ref 0–1.3)
MONOCYTES NFR BLD AUTO: 9.1 %
NEUTROPHILS # BLD AUTO: 5.3 10E9/L (ref 1.6–8.3)
NEUTROPHILS NFR BLD AUTO: 60.1 %
NRBC # BLD AUTO: 0 10*3/UL
NRBC BLD AUTO-RTO: 0 /100
PLATELET # BLD AUTO: 341 10E9/L (ref 150–450)
POTASSIUM SERPL-SCNC: 3.6 MMOL/L (ref 3.4–5.3)
RBC # BLD AUTO: 4.13 10E12/L (ref 3.8–5.2)
SODIUM SERPL-SCNC: 138 MMOL/L (ref 133–144)
SPECIMEN SOURCE: NORMAL
TIBC SERPL-MCNC: 408 UG/DL (ref 240–430)
TROPONIN I SERPL-MCNC: <0.015 UG/L (ref 0–0.04)
WBC # BLD AUTO: 8.9 10E9/L (ref 4–11)

## 2019-02-25 PROCEDURE — 94640 AIRWAY INHALATION TREATMENT: CPT

## 2019-02-25 PROCEDURE — 82728 ASSAY OF FERRITIN: CPT | Performed by: EMERGENCY MEDICINE

## 2019-02-25 PROCEDURE — 84484 ASSAY OF TROPONIN QUANT: CPT | Performed by: EMERGENCY MEDICINE

## 2019-02-25 PROCEDURE — 83550 IRON BINDING TEST: CPT | Performed by: EMERGENCY MEDICINE

## 2019-02-25 PROCEDURE — 93005 ELECTROCARDIOGRAM TRACING: CPT

## 2019-02-25 PROCEDURE — 99285 EMERGENCY DEPT VISIT HI MDM: CPT | Mod: 25

## 2019-02-25 PROCEDURE — 80048 BASIC METABOLIC PNL TOTAL CA: CPT | Performed by: EMERGENCY MEDICINE

## 2019-02-25 PROCEDURE — 87804 INFLUENZA ASSAY W/OPTIC: CPT | Mod: 91 | Performed by: EMERGENCY MEDICINE

## 2019-02-25 PROCEDURE — 71046 X-RAY EXAM CHEST 2 VIEWS: CPT

## 2019-02-25 PROCEDURE — 85025 COMPLETE CBC W/AUTO DIFF WBC: CPT | Performed by: EMERGENCY MEDICINE

## 2019-02-25 PROCEDURE — 25000132 ZZH RX MED GY IP 250 OP 250 PS 637: Performed by: EMERGENCY MEDICINE

## 2019-02-25 PROCEDURE — 83540 ASSAY OF IRON: CPT | Performed by: EMERGENCY MEDICINE

## 2019-02-25 PROCEDURE — 25000125 ZZHC RX 250

## 2019-02-25 RX ORDER — ACETAMINOPHEN 325 MG/1
650 TABLET ORAL ONCE
Status: COMPLETED | OUTPATIENT
Start: 2019-02-25 | End: 2019-02-25

## 2019-02-25 RX ORDER — IPRATROPIUM BROMIDE AND ALBUTEROL SULFATE 2.5; .5 MG/3ML; MG/3ML
SOLUTION RESPIRATORY (INHALATION)
Status: COMPLETED
Start: 2019-02-25 | End: 2019-02-25

## 2019-02-25 RX ORDER — PREDNISONE 10 MG/1
TABLET ORAL
Qty: 32 TABLET | Refills: 0 | Status: SHIPPED | OUTPATIENT
Start: 2019-02-25 | End: 2019-03-07

## 2019-02-25 RX ORDER — ALBUTEROL SULFATE 90 UG/1
2 AEROSOL, METERED RESPIRATORY (INHALATION) EVERY 6 HOURS PRN
Qty: 1 INHALER | Refills: 0 | Status: SHIPPED | OUTPATIENT
Start: 2019-02-25 | End: 2019-03-18

## 2019-02-25 RX ADMIN — ACETAMINOPHEN 650 MG: 325 TABLET, FILM COATED ORAL at 15:39

## 2019-02-25 RX ADMIN — IPRATROPIUM BROMIDE AND ALBUTEROL SULFATE 3 ML: .5; 3 SOLUTION RESPIRATORY (INHALATION) at 15:00

## 2019-02-25 ASSESSMENT — ENCOUNTER SYMPTOMS
RHINORRHEA: 1
WHEEZING: 1
DYSURIA: 0
HEMATURIA: 0
SHORTNESS OF BREATH: 1
VOMITING: 0
FEVER: 0
DIARRHEA: 0
NAUSEA: 0
CHILLS: 0
ABDOMINAL PAIN: 0
BLOOD IN STOOL: 0
COUGH: 1
PALPITATIONS: 0
CONSTIPATION: 0

## 2019-02-25 NOTE — ED PROVIDER NOTES
History     Chief Complaint:  short of breath, chest pain, using a lot of nebs      The history is provided by the patient.      Carlotta Arce is a 53 year old female with a history of asthma who presents with shortness of breath and chest pain. The patient reports that she has been not feeling well for the past month secondary to upper respiratory symptoms (productive cough, nasal congestion, shortness of breath, and wheezing). She was evaluated for this in the ED on 2/18/19 and was sent home with a course of prednisone that she completed two days ago (prior to this, she was on a 10 day course starting on 2/5/19). This morning, she woke up at 4AM with left sided chest pain that she characterizes as a sharp stabbing sensation. She notes that the pain is constant, but fluctuates in severity, and seems to increase with activity. She has taken 400mg of ibuprofen without relief and is concerned for continuing symptoms. She denies any fever, chills, hemoptysis, nausea, vomiting, abdominal pain, changes in bowels/bladder, leg swelling, or rash. She voices no further complaints at this time.     The patient denies a history of diabetes mellitus, hypertension, hyperlipidemia, heart disease, or blood clots. She states that her mother had a heart attack in her 30s and had a pacemaker placed. She is a daily tobacco user. She does not use estrogen products.      Allergies:  No known drug allergies      Medications:    Albuterol  Ferrous sulfate  Prednisone   Loperamide     Past Medical History:    Moderate persistent asthma  Kyphoscoliosis and scoliosis     Past Surgical History:    Cholecystectomy   Tubal ligation    Family History:    Asthma  Lung cancer  Ovarian cancer     Social History:  PCP: Casey Gottlieb   Marital Status:    Smoking status: current every day smoker  Alcohol use: yes, 1-2 times per week      Review of Systems   Constitutional: Negative for chills and fever.   HENT: Positive for  congestion and rhinorrhea.    Respiratory: Positive for cough, shortness of breath and wheezing.    Cardiovascular: Positive for chest pain. Negative for palpitations and leg swelling.   Gastrointestinal: Negative for abdominal pain, blood in stool, constipation, diarrhea, nausea and vomiting.   Genitourinary: Negative for dysuria and hematuria.   All other systems reviewed and are negative.      Physical Exam     Patient Vitals for the past 24 hrs:   BP Temp Temp src Pulse Heart Rate Resp SpO2 Weight   02/25/19 1431 -- -- -- -- 74 9 99 % --   02/25/19 1430 119/81 -- -- 80 -- -- -- --   02/25/19 1424 136/85 98.6  F (37  C) Temporal 83 -- 20 98 % 69.9 kg (154 lb)        Physical Exam  Exam:  Constitutional: Alert and no distress, sitting up in bed   Head: Normocephalic.   ENT: TMs normal without fluid or infection. Congested nasal mucosa. Posterior oropharynx normal without erythema or exudate.   Cardiovascular: Regular rate and rhythm. No murmurs, gallops or rub  Respiratory: Faint wheezes in bilateral bases. No crackles.   Gastrointestinal: Abdomen soft, non-tender. Bowel sounds normoactive.   Musculoskeletal: Extremities normal- no gross deformities noted, normal muscle tone.  Skin: No suspicious lesions or rashes  Neurologic: No focal abnormalities. Sensation to light touch intact. Strength equal and symmetric.   Psychiatric: Alert and oriented x3. Appropriate affect.     Emergency Department Course     ECG (14:18:38):  Rate 79 bpm. UT interval 140. QRS duration 90. QT/QTc 356/408. P-R-T axes 72 67 65.   Sinus rhythm  Normal ECG  When compared with ECG of 28-AUG-2014 02:05,  No significant change was found  Interpreted by Anatoly Baker MD.     Imaging:  Radiographic findings were communicated with the patient who voiced understanding of the findings.    XR Chest 2 Views  Impression: No acute abnormality. Lungs are well-inflated and clear.  Heart size is normal. Thoracolumbar scoliosis noted.  Cholecystectomy  clips noted.  As read by Radiology.     Laboratory:  CBC: HGB 7.7, WNL (WBC 8.9, )     BMP: creatinine 0.53, calcium 8.2    Troponin I: <0.015    Iron and iron binding capacity: iron 11, iron saturation index 3, iron binding cap 408    Ferritin: 4    Influenza A/B: negative    Interventions:  1500: Duoneb, 3 mL, nebulization    1539: acetaminophen 650 mg, PO     Emergency Department Course:  Past medical records, nursing notes, and vitals reviewed.  I performed an exam of the patient and obtained history, as documented above.    IV inserted and blood drawn.   The patient was sent for a Xray while in the emergency department, findings above.     1750: I rechecked the patient. Explained findings to the patient.    I rechecked the patient. Findings and plan explained to the Patient. Patient discharged home with instructions regarding supportive care, medications, and reasons to return. The importance of close follow-up was reviewed.       Impression & Plan      Medical Decision Makin-year-old female here with chest pain shortness of breath concerns here for bronchitis and reactive airway disease versus symptomatic anemia versus ACS versus AMI less likely pulmonary embolism by history and examination here.  Workup here showing acute on chronic anemia I do not think she needs emergent blood transfusion this is due to iron deficiency and she needs iron supplementation.  She also has evidence of bronchospasm here in the evidence of pneumonia on her chest x-ray.  We will put her back on prednisone and a longer taper continue the bronchodilators.  EKG showed no concerns for acute ischemia troponin was negative.  Patient felt improved with these interventions here in the emergency room understands the plan and when to return here to the emergency room.      Diagnosis:    ICD-10-CM    1. Dyspnea, unspecified type R06.00    2. Bronchitis J40    3. Iron deficiency anemia, unspecified iron deficiency  anemia type D50.9    4. ASTHMA - MODERATE PERSISTENT J45.40        Disposition:  discharged to home    Discharge Medications:     Medication List      Started    ferrous sulfate  (45 Fe) MG CR tablet  Commonly known as:  SLO-FE  142 mg, Oral, 3 TIMES DAILY WITH MEALS        Modified    * predniSONE 20 MG tablet  Commonly known as:  DELTASONE  Take two tablets (= 40mg) each day for 5 (five) days  What changed:  Another medication with the same name was added. Make sure you understand how and when to take each.     * predniSONE 10 MG tablet  Commonly known as:  DELTASONE  Take 4 tablets daily for 5 days,  take 2 tablets daily for 3 days, take 1 tablet daily for 3 days, take half a tablet for 3 days.  What changed:  You were already taking a medication with the same name, and this prescription was added. Make sure you understand how and when to take each.         * This list has 2 medication(s) that are the same as other medications prescribed for you. Read the directions carefully, and ask your doctor or other care provider to review them with you.            ASK your doctor about these medications    ondansetron 4 MG ODT tab  Commonly known as:  ZOFRAN ODT  4 mg, Oral, EVERY 8 HOURS PRN  Ask about: Should I take this medication?     * predniSONE 20 MG tablet  Commonly known as:  DELTASONE  2 tabs day 1-2, then 1 tab days 3-4, then 1/2 tab daily for 6 days  Ask about: Should I take this medication?         * This list has 1 medication(s) that are the same as other medications prescribed for you. Read the directions carefully, and ask your doctor or other care provider to review them with you.                  KRISTIN Gutierrez-S  2/25/2019   [facility] EMERGENCY DEPARTMENT       Anatoly Baker MD  02/26/19 0100

## 2019-02-25 NOTE — ED AVS SNAPSHOT
[facility] Emergency Department  [address]  [facility] [address]  Phone:  [phone]  Fax:  [phone]                                    Carlotta Arce   MRN: [id number]    Department:  [facility] Emergency Department   Date of Visit:  2/25/2019           After Visit Summary Signature Page    I have received my discharge instructions, and my questions have been answered. I have discussed any challenges I see with this plan with the nurse or doctor.    ..........................................................................................................................................  Patient/Patient Representative Signature      ..........................................................................................................................................  Patient Representative Print Name and Relationship to Patient    ..................................................               ................................................  Date                                   Time    ..........................................................................................................................................  Reviewed by Signature/Title    ...................................................              ..............................................  Date                                               Time          22EPIC Rev 08/18

## 2019-02-25 NOTE — TELEPHONE ENCOUNTER
"FNA triage call :   Presenting problem :  Mild  Chest pain since 3am today  and up to 3 minutes duration  .  HX of Asthma .   Guideline used : chest pain - adult   Disposition and recommendations : Have someone drive you to ED and Pt agrees to go to [facility] ED .   Caller verbalizes understanding and denies further questions and will call back if further symptoms to triage or questions  . Evon Irizarry RN  - [facility] Nurse Advisor       Reason for Disposition    Taking a deep breath makes pain worse    Additional Information    Negative: Severe difficulty breathing (e.g., struggling for each breath, speaks in single words)    Negative: Difficult to awaken or acting confused (e.g., disoriented, slurred speech)    Negative: Shock suspected (e.g., cold/pale/clammy skin, too weak to stand, low BP, rapid pulse)    Negative: [1] Chest pain lasts > 5 minutes AND [2] history of heart disease  (i.e., heart attack, bypass surgery, angina, angioplasty, CHF; not just a heart murmur)    Negative: [1] Chest pain lasts > 5 minutes AND [2] described as crushing, pressure-like, or heavy    Negative: [1] Chest pain lasts > 5 minutes AND [2] age > 50    Negative: [1] Chest pain lasts > 5 minutes AND [2] age > 30 AND [3] at least one cardiac risk factor (i.e., hypertension, diabetes, obesity, smoker or strong family history of heart disease)    Negative: [1] Chest pain lasts > 5 minutes AND [2] not relieved with nitroglycerin    Negative: Passed out (i.e., lost consciousness, collapsed and was not responding)    Negative: Heart beating < 50 beats per minute OR > 140 beats per minute    Negative: Visible sweat on face or sweat dripping down face    Negative: Sounds like a life-threatening emergency to the triager    Negative: Followed a chest injury    Negative: SEVERE chest pain    Negative: [1] Intermittent  chest pain or \"angina\" AND [2] increasing in severity or frequency  (Exception: pains lasting a few seconds)    Negative: Pain " "also present in shoulder(s) or arm(s) or jaw  (Exception: pain is clearly made worse by movement)    Negative: Difficulty breathing    Negative: Dizziness or lightheadedness    Negative: Coughing up blood    Negative: Cocaine use within last 3 days    Negative: History of prior \"blood clot\" in leg or lungs (i.e., deep vein thrombosis, pulmonary embolism)    Negative: Recent illness requiring prolonged bedrest (i.e., immobilization)    Negative: Hip or leg fracture in past 2 months (e.g., had cast on leg or ankle)    Negative: Major surgery in the past month    Negative: Recent long-distance travel with prolonged time in car, bus, plane, or train (i.e., within past 2 weeks; 6 or  more hours duration)    Negative: Chest pain lasts > 5 minutes (Exceptions: chest pain occurring > 3 days ago and now asymptomatic; same as previously diagnosed heartburn and has accompanying sour taste in mouth)    Protocols used: CHEST PAIN-ADULT-      "

## 2019-02-25 NOTE — ED TRIAGE NOTES
Shortness of breath today, chest pain since yesterday.  Using nebs more frequently than normal.  ABCDs intact.

## 2019-02-26 LAB — INTERPRETATION ECG - MUSE: NORMAL

## 2019-03-18 ENCOUNTER — ANCILLARY PROCEDURE (OUTPATIENT)
Dept: MAMMOGRAPHY | Facility: CLINIC | Age: 54
End: 2019-03-18
Attending: INTERNAL MEDICINE
Payer: MEDICAID

## 2019-03-18 ENCOUNTER — OFFICE VISIT (OUTPATIENT)
Dept: INTERNAL MEDICINE | Facility: CLINIC | Age: 54
End: 2019-03-18
Payer: MEDICAID

## 2019-03-18 VITALS
HEART RATE: 90 BPM | RESPIRATION RATE: 16 BRPM | BODY MASS INDEX: 32.78 KG/M2 | WEIGHT: 162.3 LBS | OXYGEN SATURATION: 100 % | TEMPERATURE: 97.4 F | DIASTOLIC BLOOD PRESSURE: 70 MMHG | SYSTOLIC BLOOD PRESSURE: 120 MMHG

## 2019-03-18 DIAGNOSIS — Z12.31 VISIT FOR SCREENING MAMMOGRAM: ICD-10-CM

## 2019-03-18 DIAGNOSIS — J45.40 MODERATE PERSISTENT ASTHMA WITHOUT COMPLICATION: Primary | ICD-10-CM

## 2019-03-18 DIAGNOSIS — Z13.6 CARDIOVASCULAR SCREENING; LDL GOAL LESS THAN 160: ICD-10-CM

## 2019-03-18 PROCEDURE — 99214 OFFICE O/P EST MOD 30 MIN: CPT | Performed by: INTERNAL MEDICINE

## 2019-03-18 PROCEDURE — 77067 SCR MAMMO BI INCL CAD: CPT | Mod: TC

## 2019-03-18 RX ORDER — ALBUTEROL SULFATE 90 UG/1
2 AEROSOL, METERED RESPIRATORY (INHALATION) EVERY 6 HOURS PRN
Qty: 18 G | Refills: 11 | Status: SHIPPED | OUTPATIENT
Start: 2019-03-18 | End: 2020-12-14

## 2019-03-18 RX ORDER — BUDESONIDE AND FORMOTEROL FUMARATE DIHYDRATE 80; 4.5 UG/1; UG/1
2 AEROSOL RESPIRATORY (INHALATION) 2 TIMES DAILY
Qty: 10.2 G | Refills: 11 | Status: SHIPPED | OUTPATIENT
Start: 2019-03-18 | End: 2020-12-14

## 2019-03-18 RX ORDER — ALBUTEROL SULFATE 0.83 MG/ML
2.5 SOLUTION RESPIRATORY (INHALATION) EVERY 4 HOURS PRN
Qty: 1 BOX | Refills: 0 | Status: SHIPPED | OUTPATIENT
Start: 2019-03-18 | End: 2019-07-23

## 2019-03-18 NOTE — PROGRESS NOTES
SUBJECTIVE:   Carlotta Arce is a 53 year old female who presents to clinic today for the following health issues:      Asthma Follow-Up    Was ACT completed today?    Yes    ACT Total Scores 3/18/2019   ACT TOTAL SCORE -   ASTHMA ER VISITS -   ASTHMA HOSPITALIZATIONS -   ACT TOTAL SCORE (Goal Greater than or Equal to 20) 13   In the past 12 months, how many times did you visit the emergency room for your asthma without being admitted to the hospital? 1   In the past 12 months, how many times were you hospitalized overnight because of your asthma? 0       Recent asthma triggers that patient is dealing with: upper respiratory infections        Amount of exercise or physical activity: 6-7 days/week for an average of 45-60 minutes    Problems taking medications regularly: No    Medication side effects: none    Diet: regular (no restrictions)            Problem list and histories reviewed & adjusted, as indicated.  Additional history:     Has been a few years since seen in clinic.  Needs refill of asthma medications.  At present, only requesting albuterol inhaler and nebulized albuterol.  She has not been on controller inhaler recently.  Today's ACT noted.    Reviewed and updated as needed this visit by clinical staff  Tobacco  Allergies  Meds       Reviewed and updated as needed this visit by Provider         ROS:  Constitutional, HEENT, cardiovascular, pulmonary, GI, , musculoskeletal, neuro, skin, endocrine and psych systems are negative, except as otherwise noted.    OBJECTIVE:     /70 (BP Location: Left arm, Patient Position: Chair, Cuff Size: Adult Regular)   Pulse 90   Temp 97.4  F (36.3  C) (Oral)   Resp 16   Wt 73.6 kg (162 lb 4.8 oz)   SpO2 100%   BMI 32.78 kg/m    Body mass index is 32.78 kg/m .  GENERAL: healthy, alert and no distress  NECK: no adenopathy, no asymmetry, masses, or scars and thyroid normal to palpation  RESP: Mild diffuse end expiratory wheezing  CV: regular rate  and rhythm, normal S1 S2, no S3 or S4, no murmur, click or rub, no peripheral edema and peripheral pulses strong  ABDOMEN: soft, nontender, no hepatosplenomegaly, no masses and bowel sounds normal  MS: no gross musculoskeletal defects noted, no edema        ASSESSMENT/PLAN:     1. Moderate persistent asthma without complication  Continue albuterol as needed, start Symbicort (favorable for her insurance) twice daily.  Counseled to rinse mouth with tap water after each Symbicort dose.  Recheck in 1-2 months, with surveillance labs prior.  - Asthma Action Plan (AAP)  - albuterol (PROVENTIL) (2.5 MG/3ML) 0.083% neb solution; Take 1 vial (2.5 mg) by nebulization every 4 hours as needed for shortness of breath / dyspnea  Dispense: 1 Box; Refill: 0  - albuterol (PROAIR HFA) 108 (90 Base) MCG/ACT inhaler; Inhale 2 puffs into the lungs every 6 hours as needed for shortness of breath / dyspnea  Dispense: 18 g; Refill: 11  - budesonide-formoterol (SYMBICORT) 80-4.5 MCG/ACT Inhaler; Inhale 2 puffs into the lungs 2 times daily  Dispense: 10.2 g; Refill: 11  - Comprehensive metabolic panel; Future      2. CARDIOVASCULAR SCREENING; LDL GOAL LESS THAN 160    - Lipid panel reflex to direct LDL Fasting; Future        Casey Gottlieb MD  [facility]

## 2019-03-18 NOTE — LETTER
My Asthma Action Plan  Name: Carlotta Arce   YOB: 1965  Date: 3/18/2019   My doctor: Casey Gottlieb MD   My clinic: [facility][1]        My Control Medicine: None  My Rescue Medicine: Albuterol (Proair/Ventolin/Proventil) inhaler 2 puffs into the lungs every 6 hours as needed for shortness of breath   My Asthma Severity: moderate persistent  Avoid your asthma triggers: upper respiratory infections               GREEN ZONE   Good Control    I feel good    No cough or wheeze    Can work, sleep and play without asthma symptoms       Take your asthma control medicine every day.     1. If exercise triggers your asthma, take your rescue medication    15 minutes before exercise or sports, and    During exercise if you have asthma symptoms  2. Spacer to use with inhaler: If you have a spacer, make sure to use it with your inhaler             YELLOW ZONE Getting Worse  I have ANY of these:    I do not feel good    Cough or wheeze    Chest feels tight    Wake up at night   1. Keep taking your Green Zone medications  2. Start taking your rescue medicine:    every 20 minutes for up to 1 hour. Then every 4 hours for 24-48 hours.  3. If you stay in the Yellow Zone for more than 12-24 hours, contact your doctor.  4. If you do not return to the Green Zone in 12-24 hours or you get worse, start taking your oral steroid medicine if prescribed by your provider.           RED ZONE Medical Alert - Get Help  I have ANY of these:    I feel awful    Medicine is not helping    Breathing getting harder    Trouble walking or talking    Nose opens wide to breathe       1. Take your rescue medicine NOW  2. If your provider has prescribed an oral steroid medicine, start taking it NOW  3. Call your doctor NOW  4. If you are still in the Red Zone after 20 minutes and you have not reached your doctor:    Take your rescue medicine again and    Call 911 or go to the emergency room right away    See  your regular doctor within 2 weeks of an Emergency Room or Urgent Care visit for follow-up treatment.          Annual Reminders:  Meet with Asthma Educator,  Flu Shot in the Fall, consider Pneumonia Vaccination for patients with asthma (aged 19 and older).    Pharmacy: [email or web address] [address] [place], [place] - [address] AT [facility] [address]                      Asthma Triggers  How To Control Things That Make Your Asthma Worse    Triggers are things that make your asthma worse.  Look at the list below to help you find your triggers and what you can do about them.  You can help prevent asthma flare-ups by staying away from your triggers.      Trigger                                                          What you can do   Cigarette Smoke  Tobacco smoke can make asthma worse. Do not allow smoking in your home, car or around you.  Be sure no one smokes at a child s day care or school.  If you smoke, ask your health care provider for ways to help you quit.  Ask family members to quit too.  Ask your health care provider for a referral to Quit Plan to help you quit smoking, or call 9-507-164-PLAN.     Colds, Flu, Bronchitis  These are common triggers of asthma. Wash your hands often.  Don t touch your eyes, nose or mouth.  Get a flu shot every year.     Dust Mites  These are tiny bugs that live in cloth or carpet. They are too small to see. Wash sheets and blankets in hot water every week.   Encase pillows and mattress in dust mite proof covers.  Avoid having carpet if you can. If you have carpet, vacuum weekly.   Use a dust mask and HEPA vacuum.   Pollen and Outdoor Mold  Some people are allergic to trees, grass, or weed pollen, or molds. Try to keep your windows closed.  Limit time out doors when pollen count is high.   Ask you health care provider about taking medicine during allergy season.     Animal Dander  Some people are allergic to skin flakes, urine or saliva from pets with fur or feathers. Keep  pets with fur or feathers out of your home.    If you can t keep the pet outdoors, then keep the pet out of your bedroom.  Keep the bedroom door closed.  Keep pets off cloth furniture and away from stuffed toys.     Mice, Rats, and Cockroaches  Some people are allergic to the waste from these pests.   Cover food and garbage.  Clean up spills and food crumbs.  Store grease in the refrigerator.   Keep food out of the bedroom.   Indoor Mold  This can be a trigger if your home has high moisture. Fix leaking faucets, pipes, or other sources of water.   Clean moldy surfaces.  Dehumidify basement if it is damp and smelly.   Smoke, Strong Odors, and Sprays  These can reduce air quality. Stay away from strong odors and sprays, such as perfume, powder, hair spray, paints, smoke incense, paint, cleaning products, candles and new carpet.   Exercise or Sports  Some people with asthma have this trigger. Be active!  Ask your doctor about taking medicine before sports or exercise to prevent symptoms.    Warm up for 5-10 minutes before and after sports or exercise.     Other Triggers of Asthma  Cold air:  Cover your nose and mouth with a scarf.  Sometimes laughing or crying can be a trigger.  Some medicines and food can trigger asthma.

## 2019-03-18 NOTE — PATIENT INSTRUCTIONS
- Start taking Symbicort, 2 puffs twice daily.  (Prescription has been sent to your pharmacy)  - Remember to gargle with tap water after each dose of Symbicort.    - Continue the albuterol  (inhaler or nebulizer) as needed.    - Please follow-up with me in clinic in 2 months.  - Please come in for fasting labs, a few days prior to the appointment with me.

## 2019-03-18 NOTE — LETTER
[facility]  [address] [address]  [phone]        June 25, 2019    Carlotta Arce  [address] [address]              Dear Carlotta Arce    This is to remind you that your fasting labs is due.    You may call our office at [phone] to schedule an appointment.    Please disregard this notice if you have already had your labs drawn or made an appointment.        Sincerely,        Csaey Gottlieb MD

## 2019-03-19 ASSESSMENT — ASTHMA QUESTIONNAIRES: ACT_TOTALSCORE: 13

## 2019-03-26 ENCOUNTER — ANCILLARY PROCEDURE (OUTPATIENT)
Dept: GENERAL RADIOLOGY | Facility: CLINIC | Age: 54
End: 2019-03-26
Attending: PHYSICIAN ASSISTANT
Payer: MEDICAID

## 2019-03-26 ENCOUNTER — OFFICE VISIT (OUTPATIENT)
Dept: URGENT CARE | Facility: URGENT CARE | Age: 54
End: 2019-03-26
Payer: MEDICAID

## 2019-03-26 VITALS — BODY MASS INDEX: 32.32 KG/M2 | WEIGHT: 160 LBS | TEMPERATURE: 97.6 F | OXYGEN SATURATION: 100 % | HEART RATE: 81 BPM

## 2019-03-26 DIAGNOSIS — S89.92XA INJURY OF LEFT LOWER EXTREMITY, INITIAL ENCOUNTER: ICD-10-CM

## 2019-03-26 DIAGNOSIS — S99.912A ANKLE INJURY, LEFT, INITIAL ENCOUNTER: ICD-10-CM

## 2019-03-26 DIAGNOSIS — S99.912A ANKLE INJURY, LEFT, INITIAL ENCOUNTER: Primary | ICD-10-CM

## 2019-03-26 PROCEDURE — 99214 OFFICE O/P EST MOD 30 MIN: CPT | Performed by: PHYSICIAN ASSISTANT

## 2019-03-26 PROCEDURE — 73590 X-RAY EXAM OF LOWER LEG: CPT | Mod: LT

## 2019-03-26 PROCEDURE — 73610 X-RAY EXAM OF ANKLE: CPT | Mod: LT

## 2019-03-26 RX ORDER — NAPROXEN 500 MG/1
500 TABLET ORAL 2 TIMES DAILY WITH MEALS
Qty: 30 TABLET | Refills: 0 | Status: SHIPPED | OUTPATIENT
Start: 2019-03-26 | End: 2019-11-08

## 2019-03-26 RX ORDER — HYDROCODONE BITARTRATE AND ACETAMINOPHEN 5; 325 MG/1; MG/1
1 TABLET ORAL EVERY 6 HOURS PRN
Qty: 10 TABLET | Refills: 0 | Status: SHIPPED | OUTPATIENT
Start: 2019-03-26 | End: 2019-03-29

## 2019-03-26 RX ORDER — NAPROXEN 500 MG/1
500 TABLET ORAL 2 TIMES DAILY WITH MEALS
Qty: 30 TABLET | Refills: 0 | Status: SHIPPED | OUTPATIENT
Start: 2019-03-26 | End: 2019-03-26

## 2019-03-26 NOTE — PROGRESS NOTES
SUBJECTIVE:  Chief Complaint   Patient presents with     Urgent Care     Pt states left leg injury from fall sxs x today      Carlotta Arce is a 53 year old female presents with a chief complaint of left leg and ankle tenderness localized pain.  The injury occurred 1 day ago  The injury happened while left ankle and leg tenderness. How: injured.  The patient complained of mild and moderate pain  and has had decreased ROM.  Pain exacerbated by walking and weight-bearing.  Relieved by rest.  She treated it initially with ice. This is the first time this type of injury has occurred to this patient.     Past Medical History:   Diagnosis Date     Allergy, unspecified not elsewhere classified      Moderate persistent asthma      Other kyphoscoliosis and scoliosis      Tobacco use disorder      ALLERGIES  No Known Allergies     Family History   Problem Relation Age of Onset     Asthma Mother      Cancer Mother         lung ca     Asthma Father      Cancer Child         Ovarian     Diabetes No family hx of      C.A.D. No family hx of      Hypertension No family hx of      Breast Cancer No family hx of      Cancer - colorectal No family hx of        Social History     Tobacco Use     Smoking status: Current Every Day Smoker     Packs/day: 0.50     Years: 21.00     Pack years: 10.50     Types: Cigarettes     Smokeless tobacco: Never Used   Substance Use Topics     Alcohol use: Yes     Comment: 1-2x/wk       ROS:  CONSTITUTIONAL:NEGATIVE for fever, chills, change in weight  INTEGUMENTARY/SKIN: POSITIVE for mild swelling left lateral ankle  ENT/MOUTH: NEGATIVE for ear, mouth and throat problems  RESP:NEGATIVE for significant cough or SOB  CV: NEGATIVE for chest pain, palpitations or peripheral edema  GI: NEGATIVE for nausea, abdominal pain, heartburn, or change in bowel habits  : negative for and dysuria  MUSCULOSKELETAL: POSITIVE  for left lateral ankle and leg tenderness  NEURO: NEGATIVE for weakness, dizziness or  paresthesias    EXAM:   Pulse 81   Temp 97.6  F (36.4  C) (Tympanic)   Wt 72.6 kg (160 lb)   SpO2 100%   BMI 32.32 kg/m    Gen: healthy, alert, active and healthy,alert,no distress  Extremity: ankle and leg has swelling and point tenderness lateral.   There is not compromise to the distal circulation.  Pulses are +2 and CRT is brisk  CHEST: clear to auscultation  CV: regular rate and rhythm  EXTREMITIES: peripheral pulses normal  MS:  decreased range of motion  and tender to palpation and localized lateral ankle tenderness, swelling  SKIN: no suspicious lesions or rashes  NEURO: Normal strength and tone, sensory exam grossly normal, mentation intact and speech normal    X-RAY was done and negative for acute changes including fracture Xray read by Alfredo Clark at time of visit    ASSESSMENT/PLAN      ICD-10-CM    1. Ankle injury, left, initial encounter S99.912A XR Ankle Left G/E 3 Views     order for DME     HYDROcodone-acetaminophen (NORCO) 5-325 MG tablet     naproxen (NAPROSYN) 500 MG tablet        2. Injury of left lower extremity, initial encounter S89.92XA XR Tibia & Fibula Left 2 Views     order for DME     HYDROcodone-acetaminophen (NORCO) 5-325 MG tablet     naproxen (NAPROSYN) 500 MG tablet         RICE treatment: Rest, Ice, compression, elevation   Wear cam walker for comfort  Naprosyn for inflammation  Vicodin for pain  Return to activity as tolerated

## 2019-05-06 ENCOUNTER — APPOINTMENT (OUTPATIENT)
Dept: GENERAL RADIOLOGY | Facility: CLINIC | Age: 54
End: 2019-05-06
Attending: EMERGENCY MEDICINE
Payer: COMMERCIAL

## 2019-05-06 ENCOUNTER — HOSPITAL ENCOUNTER (EMERGENCY)
Facility: CLINIC | Age: 54
Discharge: HOME | End: 2019-05-07
Attending: EMERGENCY MEDICINE | Admitting: EMERGENCY MEDICINE
Payer: COMMERCIAL

## 2019-05-06 DIAGNOSIS — R04.2 HEMOPTYSIS: ICD-10-CM

## 2019-05-06 DIAGNOSIS — J20.9 ACUTE BRONCHITIS, UNSPECIFIED ORGANISM: ICD-10-CM

## 2019-05-06 LAB
ANION GAP SERPL CALCULATED.3IONS-SCNC: 3 MMOL/L (ref 3–14)
BUN SERPL-MCNC: 12 MG/DL (ref 7–30)
CALCIUM SERPL-MCNC: 8.6 MG/DL (ref 8.5–10.1)
CHLORIDE SERPL-SCNC: 102 MMOL/L (ref 94–109)
CO2 SERPL-SCNC: 32 MMOL/L (ref 20–32)
CREAT SERPL-MCNC: 0.51 MG/DL (ref 0.52–1.04)
D DIMER PPP FEU-MCNC: 0.3 UG/ML FEU (ref 0–0.5)
GFR SERPL CREATININE-BSD FRML MDRD: >90 ML/MIN/{1.73_M2}
GLUCOSE SERPL-MCNC: 100 MG/DL (ref 70–99)
POTASSIUM SERPL-SCNC: 3.8 MMOL/L (ref 3.4–5.3)
SODIUM SERPL-SCNC: 137 MMOL/L (ref 133–144)

## 2019-05-06 PROCEDURE — 80048 BASIC METABOLIC PNL TOTAL CA: CPT | Performed by: EMERGENCY MEDICINE

## 2019-05-06 PROCEDURE — 96374 THER/PROPH/DIAG INJ IV PUSH: CPT

## 2019-05-06 PROCEDURE — 25000125 ZZHC RX 250: Performed by: EMERGENCY MEDICINE

## 2019-05-06 PROCEDURE — 85025 COMPLETE CBC W/AUTO DIFF WBC: CPT | Performed by: EMERGENCY MEDICINE

## 2019-05-06 PROCEDURE — 94640 AIRWAY INHALATION TREATMENT: CPT

## 2019-05-06 PROCEDURE — 85379 FIBRIN DEGRADATION QUANT: CPT | Performed by: EMERGENCY MEDICINE

## 2019-05-06 PROCEDURE — 25000128 H RX IP 250 OP 636: Performed by: EMERGENCY MEDICINE

## 2019-05-06 PROCEDURE — 99284 EMERGENCY DEPT VISIT MOD MDM: CPT | Mod: 25

## 2019-05-06 PROCEDURE — 71046 X-RAY EXAM CHEST 2 VIEWS: CPT

## 2019-05-06 RX ORDER — DEXAMETHASONE SODIUM PHOSPHATE 10 MG/ML
10 INJECTION, SOLUTION INTRAMUSCULAR; INTRAVENOUS ONCE
Status: COMPLETED | OUTPATIENT
Start: 2019-05-06 | End: 2019-05-06

## 2019-05-06 RX ORDER — IPRATROPIUM BROMIDE AND ALBUTEROL SULFATE 2.5; .5 MG/3ML; MG/3ML
3 SOLUTION RESPIRATORY (INHALATION) ONCE
Status: COMPLETED | OUTPATIENT
Start: 2019-05-06 | End: 2019-05-06

## 2019-05-06 RX ADMIN — IPRATROPIUM BROMIDE AND ALBUTEROL SULFATE 3 ML: .5; 3 SOLUTION RESPIRATORY (INHALATION) at 22:04

## 2019-05-06 RX ADMIN — DEXAMETHASONE SODIUM PHOSPHATE 10 MG: 10 INJECTION, SOLUTION INTRAMUSCULAR; INTRAVENOUS at 23:13

## 2019-05-06 ASSESSMENT — ENCOUNTER SYMPTOMS
COUGH: 1
MYALGIAS: 1
SHORTNESS OF BREATH: 1

## 2019-05-06 NOTE — ED AVS SNAPSHOT
[facility] Emergency Department  [address]  [facility] [address]  Phone:  [phone]  Fax:  [phone]                                    Carlotta Arce   MRN: [id number]    Department:  [facility] Emergency Department   Date of Visit:  5/6/2019           After Visit Summary Signature Page    I have received my discharge instructions, and my questions have been answered. I have discussed any challenges I see with this plan with the nurse or doctor.    ..........................................................................................................................................  Patient/Patient Representative Signature      ..........................................................................................................................................  Patient Representative Print Name and Relationship to Patient    ..................................................               ................................................  Date                                   Time    ..........................................................................................................................................  Reviewed by Signature/Title    ...................................................              ..............................................  Date                                               Time          22EPIC Rev 08/18

## 2019-05-07 VITALS
HEART RATE: 76 BPM | TEMPERATURE: 98.4 F | OXYGEN SATURATION: 100 % | RESPIRATION RATE: 20 BRPM | DIASTOLIC BLOOD PRESSURE: 70 MMHG | SYSTOLIC BLOOD PRESSURE: 116 MMHG

## 2019-05-07 LAB
ANISOCYTOSIS BLD QL SMEAR: ABNORMAL
BASOPHILS # BLD AUTO: 0.1 10E9/L (ref 0–0.2)
BASOPHILS NFR BLD AUTO: 1.6 %
DIFFERENTIAL METHOD BLD: ABNORMAL
EOSINOPHIL # BLD AUTO: 0.1 10E9/L (ref 0–0.7)
EOSINOPHIL NFR BLD AUTO: 1.7 %
ERYTHROCYTE [DISTWIDTH] IN BLOOD BY AUTOMATED COUNT: 19.9 % (ref 10–15)
HCT VFR BLD AUTO: 30 % (ref 35–47)
HGB BLD-MCNC: 8.2 G/DL (ref 11.7–15.7)
HYPOCHROMIA BLD QL: PRESENT
IMM GRANULOCYTES # BLD: 0 10E9/L (ref 0–0.4)
IMM GRANULOCYTES NFR BLD: 0.3 %
LYMPHOCYTES # BLD AUTO: 2 10E9/L (ref 0.8–5.3)
LYMPHOCYTES NFR BLD AUTO: 28.5 %
MACROCYTES BLD QL SMEAR: PRESENT
MCH RBC QN AUTO: 17.8 PG (ref 26.5–33)
MCHC RBC AUTO-ENTMCNC: 27.3 G/DL (ref 31.5–36.5)
MCV RBC AUTO: 65 FL (ref 78–100)
MICROCYTES BLD QL SMEAR: PRESENT
MONOCYTES # BLD AUTO: 0.7 10E9/L (ref 0–1.3)
MONOCYTES NFR BLD AUTO: 9.2 %
NEUTROPHILS # BLD AUTO: 4.1 10E9/L (ref 1.6–8.3)
NEUTROPHILS NFR BLD AUTO: 58.7 %
NRBC # BLD AUTO: 0 10*3/UL
NRBC BLD AUTO-RTO: 0 /100
PLATELET # BLD AUTO: 569 10E9/L (ref 150–450)
PLATELET # BLD EST: ABNORMAL 10*3/UL
RBC # BLD AUTO: 4.61 10E12/L (ref 3.8–5.2)
ROULEAUX BLD QL SMEAR: ABNORMAL
WBC # BLD AUTO: 7.1 10E9/L (ref 4–11)

## 2019-05-07 RX ORDER — DOXYCYCLINE 100 MG/1
100 CAPSULE ORAL 2 TIMES DAILY
Qty: 14 CAPSULE | Refills: 0 | Status: SHIPPED | OUTPATIENT
Start: 2019-05-07 | End: 2019-11-08

## 2019-05-07 RX ORDER — DEXAMETHASONE 4 MG/1
8 TABLET ORAL
Qty: 2 TABLET | Refills: 0 | Status: SHIPPED | OUTPATIENT
Start: 2019-05-09 | End: 2019-11-08

## 2019-05-07 NOTE — ED PROVIDER NOTES
History     Chief Complaint:  Shortness of Breath      HPI   Carlotta Arce is a 53 year old female who presents with shortness of breath. The patient reports that when she eats she tends to vomit afterwards, but does not feel sick. Her spouse reports that she tends to vomit every night. Here, the patient says that this has been an on going problem for the past 5 months, so she called the nurse line, and was told to come into the emergency department for further evaluation. She endorses a cough in which she spits up some blood and she has had worsening shortness of breath. She also notes right side abdominal pain, she has had her gallbladder taken out. The patient is not on any blood thinners, has not had blood clots in her chest before, no problems with stomach acid or ulcers, and no nose bleeds.      Allergies:  No known drug allergies       Medications:    Albuterol inhaler  Symbicort inhaler  Naproxen      Past Medical History:    Moderate persistent asthma  Kyphoscoliosis and scoliosis  Tobacco use disorder      Past Surgical History:    Laparoscopic cholecystectomy  Tubal ligation      Family History:    Asthma  Lung cancer  Ovarian cancer      Social History:  Smoking status: Current every day smoker  Alcohol use: Yes  Drug use: No  PCP: Casey Gottlieb  Presents to the ED with her spouse  Marital Status:   [2]       Review of Systems   Respiratory: Positive for cough and shortness of breath.    Musculoskeletal: Positive for myalgias.   All other systems reviewed and are negative.        Physical Exam     Patient Vitals for the past 24 hrs:   BP Temp Temp src Pulse Heart Rate Resp SpO2   05/06/19 2200 115/77 98.4  F (36.9  C) Oral 83 83 20 95 %       Physical Exam    HEENT:         Normal conjunctiva, No  discharge           R external ear and EAC normal         L  external ear and EAC normal           Posterior oropharynx:               No erythema,               No exudates,                Tonsillar hypertrophy - no              uvula midline - yes    Neck: no adenopathy      Lungs:     clear to auscultation    Heart: Regular, no m/r/g, ppi    Neuro: alert, no focal gross focal deficits    Psych: Normal mood and affect      Emergency Department Course   Laboratory:  CBC: HGB 8.2 (L),  (H) o/w WNL (WBC 7.1)  BMP: Glucose 100 (H), Creatinine 0.51 (L)  D dimer quantitative: 0.3    Imaging:  Radiographic findings were communicated with the patient who voiced understanding of the findings.    XR Chest, 2 Views  No acute abnormality.  As read by Radiology.    Interventions:  2204: 3 mL albuterol inhaler neb solution  2313: 10 mg Decadron IV    Emergency Department Course:  Past medical records, nursing notes, and vitals reviewed.  : I performed an exam of the patient and obtained history, as documented above.    IV inserted and blood drawn.    The patient was sent for a chest x-ray while in the emergency department, findings above.    Impression & Plan    Medical Decision Makin-year-old female with a history of asthma here with cough, shortness of breath, hemoptysis.  Concern here for acute bronchitis versus malignancy versus pulmonary embolism.  Chronic anemia was seen on her CBC d-dimer was negative kidney function was normal.  Chest x-ray was done and negative as well.  Presentation favors acute bronchitis rather than PE given the negative d-dimer or malignancy.  Will treat with a course of doxycycline discussed viral versus atypical bacterial components steroids and bronchodilators.  She verbalized understanding and agreement with that plan of treatment she understands that if she does not improve expected for the natural progression of bronchitis that she should return to the emergency room or see her primary care physician or pulmonologist is at that point a CT scan of the chest or bronchoscopy would be indicated.    Diagnosis:    ICD-10-CM    1. Acute bronchitis, unspecified  organism J20.9 CBC with platelets differential   2. Hemoptysis R04.2        Disposition:  discharged to home    Discharge Medications:     Medication List      ASK your doctor about these medications    ferrous sulfate  (45 Fe) MG CR tablet  Commonly known as:  SLO-FE  142 mg, Oral, 3 TIMES DAILY WITH MEALS  Ask about: Should I take this medication?     HYDROcodone-acetaminophen 5-325 MG tablet  Commonly known as:  NORCO  1 tablet, Oral, EVERY 6 HOURS PRN  Ask about: Should I take this medication?     loperamide 2 MG tablet  Commonly known as:  IMODIUM A-D  2 mg, Oral, 4 TIMES DAILY PRN  Ask about: Should I take this medication?     ondansetron 4 MG ODT tab  Commonly known as:  ZOFRAN ODT  4 mg, Oral, EVERY 8 HOURS PRN  Ask about: Should I take this medication?     * predniSONE 20 MG tablet  Commonly known as:  DELTASONE  2 tabs day 1-2, then 1 tab days 3-4, then 1/2 tab daily for 6 days  Ask about: Should I take this medication?     * predniSONE 20 MG tablet  Commonly known as:  DELTASONE  Take two tablets (= 40mg) each day for 5 (five) days  Ask about: Should I take this medication?     * predniSONE 10 MG tablet  Commonly known as:  DELTASONE  Take 4 tablets daily for 5 days,  take 2 tablets daily for 3 days, take 1 tablet daily for 3 days, take half a tablet for 3 days.  Ask about: Should I take this medication?         * This list has 3 medication(s) that are the same as other medications prescribed for you. Read the directions carefully, and ask your doctor or other care provider to review them with you.              I, Meaghan Kothari, am serving as a scribe at 10:45 PM on 5/6/2019 to document services personally performed by Anatoly Baker,  based on my observations and the provider's statements to me.       Meaghan Kothari  5/6/2019   [facility] EMERGENCY DEPARTMENT       Anatoly Baker MD  05/07/19 0103

## 2019-05-07 NOTE — DISCHARGE INSTRUCTIONS
It is important you follow up if not improving with the treatment we discussed.  If you continue to have blood in sputum despite this treatment you need to have a CT of your chest or a bronchoscopy

## 2019-05-07 NOTE — ED TRIAGE NOTES
Here for sob started tonight, use inhaler and neb but mild help, now spitting up blood. History of asthma. Wheezing in triage.

## 2019-06-12 ENCOUNTER — OFFICE VISIT (OUTPATIENT)
Dept: URGENT CARE | Facility: URGENT CARE | Age: 54
End: 2019-06-12
Payer: COMMERCIAL

## 2019-06-12 VITALS
DIASTOLIC BLOOD PRESSURE: 70 MMHG | OXYGEN SATURATION: 98 % | WEIGHT: 160 LBS | HEART RATE: 79 BPM | SYSTOLIC BLOOD PRESSURE: 128 MMHG | BODY MASS INDEX: 32.32 KG/M2 | RESPIRATION RATE: 20 BRPM

## 2019-06-12 DIAGNOSIS — M25.532 LEFT WRIST PAIN: Primary | ICD-10-CM

## 2019-06-12 LAB — URATE SERPL-MCNC: 3 MG/DL (ref 2.6–6)

## 2019-06-12 PROCEDURE — 36415 COLL VENOUS BLD VENIPUNCTURE: CPT | Performed by: FAMILY MEDICINE

## 2019-06-12 PROCEDURE — 84550 ASSAY OF BLOOD/URIC ACID: CPT | Performed by: FAMILY MEDICINE

## 2019-06-12 PROCEDURE — 99213 OFFICE O/P EST LOW 20 MIN: CPT | Performed by: FAMILY MEDICINE

## 2019-06-12 NOTE — PROGRESS NOTES
SUBJECTIVE:  Chief Complaint   Patient presents with     Wrist Pain     Pt states she woke up this morning with L wrist pain    .maryt who presents with a chief complaint of  left wrist pain.  Symptoms began day(s) ago , are moderate andstill present.  Context:Injury: no  Pain exacerbated by movement   Relieved bynothing She treated it initially with no therapy.   This is the first time this type of injury has occurred to this patient.     Past Medical History:   Diagnosis Date     Allergy, unspecified not elsewhere classified      Moderate persistent asthma      Other kyphoscoliosis and scoliosis      Tobacco use disorder        Past Surgical History:   Procedure Laterality Date     CHOLECYSTECTOMY, LAPOROSCOPIC  1996    Cholecystectomy, Laparoscopic     SURGICAL HISTORY OF -       C/S x 3     TUBAL LIGATION  1996    Became pregnant after this first tubal ligation     TUBAL LIGATION  1997    Repeat       Family History   Problem Relation Age of Onset     Asthma Mother      Cancer Mother         lung ca     Asthma Father      Cancer Child         Ovarian     Diabetes No family hx of      C.A.D. No family hx of      Hypertension No family hx of      Breast Cancer No family hx of      Cancer - colorectal No family hx of        Social History     Tobacco Use     Smoking status: Current Every Day Smoker     Packs/day: 0.50     Years: 21.00     Pack years: 10.50     Types: Cigarettes     Smokeless tobacco: Never Used   Substance Use Topics     Alcohol use: Yes     Comment: 1-2x/wk      No Known Allergies    ROS:CONSTITUTIONAL:NEGATIVE for fever, chills, change in weight  INTEGUMENTARY/SKIN: NEGATIVE for worrisome rashes, moles or lesions    EXAM: /70 (BP Location: Left arm, Patient Position: Sitting, Cuff Size: Adult Regular)   Pulse 79   Resp 20   Wt 72.6 kg (160 lb)   SpO2 98%   BMI 32.32 kg/m     NAD  Exam:wrist  tenderness to palpation and pain with rom  GENERAL APPEARANCE: healthy, alert and no  distress  EXTREMITIES: peripheral pulses normal  SKIN: no suspicious lesions or rashes  NEURO: Normal strength and tone, sensory exam grossly normal, mentation intact and speech normal    ASSESSMENT:     ICD-10-CM    1. Left wrist pain M25.532 Uric acid     order for DME     OTC/rest/ice

## 2019-07-22 ENCOUNTER — TELEPHONE (OUTPATIENT)
Dept: INTERNAL MEDICINE | Facility: CLINIC | Age: 54
End: 2019-07-22

## 2019-07-22 NOTE — LETTER
2019    To whom it may concern:    I am writing this letter on behalf of my patient, Carlotta Arce (: 1965).  This is to confirm that this patient has persistent asthma, and requires power to be able to use her nebulizer machine.      Please contact my office if you have questions or concerns.    Sincerely,        CASTILLO Gottlieb  Department of Internal Medicine  [facility]

## 2019-07-22 NOTE — TELEPHONE ENCOUNTER
Reason for Call:  Other call back    Detailed comments: Pt is requesting a letter from Dr Gottlieb.  Pt's power was shut off.  She wants a letter stating that she needs the power back on, because she uses an asthma machine.  Pt will  letter.  She wants the letter as soon as possible.    Phone Number Patient can be reached at: Home number on file [phone] (home) or [phone]    Best Time: anytime    Can we leave a detailed message on this number? YES    Call taken on 7/22/2019 at 9:10 AM by JOVANI COREAS

## 2019-07-23 DIAGNOSIS — J45.40 MODERATE PERSISTENT ASTHMA WITHOUT COMPLICATION: ICD-10-CM

## 2019-07-23 RX ORDER — ALBUTEROL SULFATE 0.83 MG/ML
2.5 SOLUTION RESPIRATORY (INHALATION) EVERY 4 HOURS PRN
Qty: 1 BOX | Refills: 0 | Status: SHIPPED | OUTPATIENT
Start: 2019-07-23 | End: 2020-12-14

## 2019-07-23 NOTE — TELEPHONE ENCOUNTER
"Requested Prescriptions   Pending Prescriptions Disp Refills     albuterol (PROVENTIL) (2.5 MG/3ML) 0.083% neb solution 1 Box 0     Sig: Take 1 vial (2.5 mg) by nebulization every 4 hours as needed for shortness of breath / dyspnea       Asthma Maintenance Inhalers - Anticholinergics Failed - 7/23/2019 10:46 AM        Failed - Asthma control assessment score within normal limits in last 6 months     Please review ACT score.           Passed - Patient is age 12 years or older        Passed - Medication is active on med list        Passed - Recent (6 mo) or future (30 days) visit within the authorizing provider's specialty     Patient had office visit in the last 6 months or has a visit in the next 30 days with authorizing provider or within the authorizing provider's specialty.  See \"Patient Info\" tab in inbasket, or \"Choose Columns\" in Meds & Orders section of the refill encounter.              "

## 2019-07-23 NOTE — TELEPHONE ENCOUNTER
Requested Prescriptions   Pending Prescriptions Disp Refills     albuterol (PROVENTIL) (2.5 MG/3ML) 0.083% neb solution  Last Written Prescription Date:  03/18/2019  Last Fill Quantity: 1 box,  # refills: 0   Last Office Visit: 3/18/2019   Future Office Visit:      1 Box 0     Sig: Take 1 vial (2.5 mg) by nebulization every 4 hours as needed for shortness of breath / dyspnea       There is no refill protocol information for this order

## 2019-10-21 ENCOUNTER — OFFICE VISIT (OUTPATIENT)
Dept: URGENT CARE | Facility: URGENT CARE | Age: 54
End: 2019-10-21
Payer: COMMERCIAL

## 2019-10-21 VITALS
DIASTOLIC BLOOD PRESSURE: 82 MMHG | HEART RATE: 85 BPM | OXYGEN SATURATION: 94 % | WEIGHT: 152 LBS | RESPIRATION RATE: 16 BRPM | TEMPERATURE: 98 F | SYSTOLIC BLOOD PRESSURE: 118 MMHG | BODY MASS INDEX: 30.7 KG/M2

## 2019-10-21 DIAGNOSIS — R06.2 WHEEZING: ICD-10-CM

## 2019-10-21 DIAGNOSIS — R09.89 CHEST CONGESTION: ICD-10-CM

## 2019-10-21 DIAGNOSIS — R06.02 SOB (SHORTNESS OF BREATH): ICD-10-CM

## 2019-10-21 DIAGNOSIS — J45.901 MODERATE ASTHMA WITH EXACERBATION, UNSPECIFIED WHETHER PERSISTENT: Primary | ICD-10-CM

## 2019-10-21 PROCEDURE — 94640 AIRWAY INHALATION TREATMENT: CPT | Mod: 76 | Performed by: PHYSICIAN ASSISTANT

## 2019-10-21 PROCEDURE — 99215 OFFICE O/P EST HI 40 MIN: CPT | Mod: 25 | Performed by: PHYSICIAN ASSISTANT

## 2019-10-21 RX ORDER — LEVALBUTEROL INHALATION SOLUTION 1.25 MG/3ML
1.25 SOLUTION RESPIRATORY (INHALATION) ONCE
Status: COMPLETED | OUTPATIENT
Start: 2019-10-21 | End: 2019-10-21

## 2019-10-21 RX ORDER — ALBUTEROL SULFATE 0.83 MG/ML
2.5 SOLUTION RESPIRATORY (INHALATION) EVERY 4 HOURS PRN
Qty: 1 BOX | Refills: 0 | Status: SHIPPED | OUTPATIENT
Start: 2019-10-21 | End: 2019-11-08

## 2019-10-21 RX ORDER — ALBUTEROL SULFATE 90 UG/1
2 AEROSOL, METERED RESPIRATORY (INHALATION) EVERY 6 HOURS
Qty: 8.5 G | Refills: 0 | Status: SHIPPED | OUTPATIENT
Start: 2019-10-21 | End: 2019-11-08

## 2019-10-21 RX ORDER — AZITHROMYCIN 250 MG/1
TABLET, FILM COATED ORAL
Qty: 6 TABLET | Refills: 0 | Status: SHIPPED | OUTPATIENT
Start: 2019-10-21 | End: 2019-11-08

## 2019-10-21 RX ORDER — PREDNISONE 20 MG/1
TABLET ORAL
Qty: 13 TABLET | Refills: 0 | Status: SHIPPED | OUTPATIENT
Start: 2019-10-21 | End: 2019-11-08

## 2019-10-21 RX ORDER — PREDNISONE 10 MG/1
40 TABLET ORAL ONCE
Status: COMPLETED | OUTPATIENT
Start: 2019-10-21 | End: 2019-10-21

## 2019-10-21 RX ADMIN — PREDNISONE 40 MG: 10 TABLET ORAL at 13:55

## 2019-10-21 RX ADMIN — Medication 0.5 MG: at 13:51

## 2019-10-21 RX ADMIN — LEVALBUTEROL INHALATION SOLUTION 1.25 MG: 1.25 SOLUTION RESPIRATORY (INHALATION) at 13:46

## 2019-10-21 NOTE — PROGRESS NOTES
SUBJECTIVE:   Carlotta Arce is a 54 year old female presenting with a chief complaint of wheezing, chest congestion, SOB and worsening asthma.  Onset of symptoms was 2 day(s) ago.  Course of illness is worsening.    Severity moderate  Current and Associated symptoms: SOB and wheezing  Treatment measures tried include albuterol.  Predisposing factors include hx of allergies and asthma.    Past Medical History:   Diagnosis Date     Allergy, unspecified not elsewhere classified      Moderate persistent asthma      Other kyphoscoliosis and scoliosis      Tobacco use disorder      ALLERGIES   No Known Allergies    Family History   Problem Relation Age of Onset     Asthma Mother      Cancer Mother         lung ca     Asthma Father      Cancer Child         Ovarian     Diabetes No family hx of      C.A.D. No family hx of      Hypertension No family hx of      Breast Cancer No family hx of      Cancer - colorectal No family hx of        Social History     Tobacco Use     Smoking status: Current Every Day Smoker     Packs/day: 0.50     Years: 21.00     Pack years: 10.50     Types: Cigarettes     Smokeless tobacco: Never Used   Substance Use Topics     Alcohol use: Yes     Comment: 1-2x/wk       ROS:  CONSTITUTIONAL:NEGATIVE for fever, chills, change in weight  INTEGUMENTARY/SKIN: NEGATIVE for worrisome rashes, moles or lesions  EYES: NEGATIVE for vision changes or irritation  ENT/MOUTH: POSITIVE for nasal congestion, sinus drainage  RESP:POSITIVE for cough-productive, Hx asthma, SOB/dyspnea and wheezing  CV: NEGATIVE for chest pain, palpitations or peripheral edema  GI: NEGATIVE for nausea, abdominal pain, heartburn, or change in bowel habits  : negative for and dysuria  MUSCULOSKELETAL: NEGATIVE for significant arthralgias or myalgia  NEURO: NEGATIVE for weakness, dizziness or paresthesias    OBJECTIVE  :/82   Pulse 85   Temp 98  F (36.7  C) (Tympanic)   Resp 16   Wt 68.9 kg (152 lb)   SpO2 94%   BMI  30.70 kg/m    GENERAL APPEARANCE: healthy, alert and no distress  EYES: EOMI,  PERRL, conjunctiva clear  HENT: TM's normal bilaterally and nasal turbinates erythematous, swollen  NECK: supple, nontender, no lymphadenopathy  RESP: expiratory wheezes generalized  CV: regular rates and rhythm, normal S1 S2, no murmur noted  ABDOMEN:  soft, nontender, no HSM or masses and bowel sounds normal  NEURO: Normal strength and tone, sensory exam grossly normal,  normal speech and mentation  SKIN: no suspicious lesions or rashes    Xopenex neb given in clinic improved symptoms mildly    Prednisone 40 mg given PO     Atrovent neb given in clinic due to asthma, wheezing    ASSESSMENT/PLAN      ICD-10-CM    1. Moderate asthma with exacerbation, unspecified whether persistent J45.901 INHALATION/NEBULIZER TREATMENT, INITIAL     levalbuterol (XOPENEX) neb solution 1.25 mg     predniSONE (DELTASONE) tablet 40 mg     predniSONE (DELTASONE) 20 MG tablet     albuterol (PROVENTIL HFA) 108 (90 Base) MCG/ACT inhaler     albuterol (PROVENTIL) (2.5 MG/3ML) 0.083% neb solution     ipratropium (ATROVENT) 0.02 % neb solution 0.5 mg   2. Wheezing R06.2 INHALATION/NEBULIZER TREATMENT, INITIAL     levalbuterol (XOPENEX) neb solution 1.25 mg     predniSONE (DELTASONE) tablet 40 mg     predniSONE (DELTASONE) 20 MG tablet     albuterol (PROVENTIL HFA) 108 (90 Base) MCG/ACT inhaler     albuterol (PROVENTIL) (2.5 MG/3ML) 0.083% neb solution     ipratropium (ATROVENT) 0.02 % neb solution 0.5 mg   3. SOB (shortness of breath) R06.02 INHALATION/NEBULIZER TREATMENT, INITIAL     levalbuterol (XOPENEX) neb solution 1.25 mg     predniSONE (DELTASONE) tablet 40 mg     predniSONE (DELTASONE) 20 MG tablet     albuterol (PROVENTIL HFA) 108 (90 Base) MCG/ACT inhaler     albuterol (PROVENTIL) (2.5 MG/3ML) 0.083% neb solution     ipratropium (ATROVENT) 0.02 % neb solution 0.5 mg   4. Chest congestion R09.89 azithromycin (ZITHROMAX) 250 MG tablet       Orders Placed  This Encounter     INHALATION/NEBULIZER TREATMENT, INITIAL     levalbuterol (XOPENEX) neb solution 1.25 mg     predniSONE (DELTASONE) tablet 40 mg     azithromycin (ZITHROMAX) 250 MG tablet     predniSONE (DELTASONE) 20 MG tablet     albuterol (PROVENTIL HFA) 108 (90 Base) MCG/ACT inhaler     albuterol (PROVENTIL) (2.5 MG/3ML) 0.083% neb solution     ipratropium (ATROVENT) 0.02 % neb solution 0.5 mg       Smoking cessation encouraged  Albuterol and atrovent given in clinic  Prednisone given in clinic  zpak for chest congestion  Continue nebs at home  Follow up with PCP as needed

## 2019-10-21 NOTE — LETTER
[facility]  [facility]    [address] [address]  Phone: [phone]    October 21, 2019      RE: Carlotta Arce  [address] [place] [id number]       To whom it may concern:    Carlotta Arce was seen in our clinic today. She  may return to work with the following: No restrictions on or about 10/22/19.    Sincerely,      Alfredo Clark PA-C

## 2019-11-08 ENCOUNTER — HOSPITAL ENCOUNTER (EMERGENCY)
Facility: CLINIC | Age: 54
Discharge: HOME | End: 2019-11-09
Attending: EMERGENCY MEDICINE | Admitting: EMERGENCY MEDICINE
Payer: COMMERCIAL

## 2019-11-08 DIAGNOSIS — J40 BRONCHITIS: ICD-10-CM

## 2019-11-08 DIAGNOSIS — J45.901 MODERATE ASTHMA WITH EXACERBATION, UNSPECIFIED WHETHER PERSISTENT: ICD-10-CM

## 2019-11-08 PROCEDURE — 99285 EMERGENCY DEPT VISIT HI MDM: CPT | Mod: 25

## 2019-11-08 PROCEDURE — 25000125 ZZHC RX 250: Performed by: EMERGENCY MEDICINE

## 2019-11-08 PROCEDURE — 94640 AIRWAY INHALATION TREATMENT: CPT

## 2019-11-08 RX ORDER — IPRATROPIUM BROMIDE AND ALBUTEROL SULFATE 2.5; .5 MG/3ML; MG/3ML
3 SOLUTION RESPIRATORY (INHALATION) ONCE
Status: COMPLETED | OUTPATIENT
Start: 2019-11-08 | End: 2019-11-09

## 2019-11-08 RX ORDER — IPRATROPIUM BROMIDE AND ALBUTEROL SULFATE 2.5; .5 MG/3ML; MG/3ML
3 SOLUTION RESPIRATORY (INHALATION) ONCE
Status: COMPLETED | OUTPATIENT
Start: 2019-11-08 | End: 2019-11-08

## 2019-11-08 RX ADMIN — IPRATROPIUM BROMIDE AND ALBUTEROL SULFATE 3 ML: .5; 3 SOLUTION RESPIRATORY (INHALATION) at 23:15

## 2019-11-08 NOTE — ED AVS SNAPSHOT
[facility] Emergency Department  [address]  [facility] [address]  Phone:  [phone]  Fax:  [phone]                                    Carlotta Arce   MRN: [id number]    Department:  [facility] Emergency Department   Date of Visit:  11/8/2019           After Visit Summary Signature Page    I have received my discharge instructions, and my questions have been answered. I have discussed any challenges I see with this plan with the nurse or doctor.    ..........................................................................................................................................  Patient/Patient Representative Signature      ..........................................................................................................................................  Patient Representative Print Name and Relationship to Patient    ..................................................               ................................................  Date                                   Time    ..........................................................................................................................................  Reviewed by Signature/Title    ...................................................              ..............................................  Date                                               Time          22EPIC Rev 08/18

## 2019-11-09 ENCOUNTER — APPOINTMENT (OUTPATIENT)
Dept: GENERAL RADIOLOGY | Facility: CLINIC | Age: 54
End: 2019-11-09
Attending: EMERGENCY MEDICINE
Payer: COMMERCIAL

## 2019-11-09 VITALS
RESPIRATION RATE: 20 BRPM | OXYGEN SATURATION: 98 % | SYSTOLIC BLOOD PRESSURE: 112 MMHG | DIASTOLIC BLOOD PRESSURE: 90 MMHG | TEMPERATURE: 98 F | BODY MASS INDEX: 30.81 KG/M2 | WEIGHT: 152.56 LBS

## 2019-11-09 PROCEDURE — 25000128 H RX IP 250 OP 636: Performed by: EMERGENCY MEDICINE

## 2019-11-09 PROCEDURE — 71046 X-RAY EXAM CHEST 2 VIEWS: CPT

## 2019-11-09 PROCEDURE — 96372 THER/PROPH/DIAG INJ SC/IM: CPT

## 2019-11-09 PROCEDURE — 25000125 ZZHC RX 250: Performed by: EMERGENCY MEDICINE

## 2019-11-09 PROCEDURE — 25000131 ZZH RX MED GY IP 250 OP 636 PS 637: Performed by: EMERGENCY MEDICINE

## 2019-11-09 RX ORDER — AZITHROMYCIN 250 MG/1
TABLET, FILM COATED ORAL
Qty: 6 TABLET | Refills: 0 | Status: SHIPPED | OUTPATIENT
Start: 2019-11-09 | End: 2019-11-14

## 2019-11-09 RX ORDER — IPRATROPIUM BROMIDE AND ALBUTEROL SULFATE 2.5; .5 MG/3ML; MG/3ML
3 SOLUTION RESPIRATORY (INHALATION) ONCE
Status: COMPLETED | OUTPATIENT
Start: 2019-11-09 | End: 2019-11-09

## 2019-11-09 RX ORDER — PREDNISONE 20 MG/1
40 TABLET ORAL DAILY
Qty: 8 TABLET | Refills: 0 | Status: SHIPPED | OUTPATIENT
Start: 2019-11-09 | End: 2019-11-13

## 2019-11-09 RX ORDER — KETOROLAC TROMETHAMINE 15 MG/ML
15 INJECTION, SOLUTION INTRAMUSCULAR; INTRAVENOUS ONCE
Status: COMPLETED | OUTPATIENT
Start: 2019-11-09 | End: 2019-11-09

## 2019-11-09 RX ORDER — IPRATROPIUM BROMIDE AND ALBUTEROL SULFATE 2.5; .5 MG/3ML; MG/3ML
3 SOLUTION RESPIRATORY (INHALATION) ONCE
Status: DISCONTINUED | OUTPATIENT
Start: 2019-11-09 | End: 2019-11-09

## 2019-11-09 RX ORDER — ALBUTEROL SULFATE 0.83 MG/ML
2.5 SOLUTION RESPIRATORY (INHALATION) EVERY 4 HOURS PRN
Qty: 1 BOX | Refills: 0 | Status: SHIPPED | OUTPATIENT
Start: 2019-11-09 | End: 2020-12-14

## 2019-11-09 RX ORDER — PREDNISONE 20 MG/1
40 TABLET ORAL ONCE
Status: COMPLETED | OUTPATIENT
Start: 2019-11-09 | End: 2019-11-09

## 2019-11-09 RX ORDER — BUDESONIDE AND FORMOTEROL FUMARATE DIHYDRATE 80; 4.5 UG/1; UG/1
2 AEROSOL RESPIRATORY (INHALATION) 2 TIMES DAILY
Qty: 1 INHALER | Refills: 0 | Status: SHIPPED | OUTPATIENT
Start: 2019-11-09 | End: 2020-12-14

## 2019-11-09 RX ADMIN — PREDNISONE 40 MG: 20 TABLET ORAL at 00:37

## 2019-11-09 RX ADMIN — KETOROLAC TROMETHAMINE 15 MG: 15 INJECTION, SOLUTION INTRAMUSCULAR; INTRAVENOUS at 00:37

## 2019-11-09 RX ADMIN — IPRATROPIUM BROMIDE AND ALBUTEROL SULFATE 3 ML: .5; 3 SOLUTION RESPIRATORY (INHALATION) at 01:01

## 2019-11-09 RX ADMIN — IPRATROPIUM BROMIDE AND ALBUTEROL SULFATE 3 ML: .5; 3 SOLUTION RESPIRATORY (INHALATION) at 00:00

## 2019-11-09 ASSESSMENT — ENCOUNTER SYMPTOMS
COUGH: 1
CHEST TIGHTNESS: 1
SHORTNESS OF BREATH: 1
WHEEZING: 1

## 2019-11-09 NOTE — ED TRIAGE NOTES
Cold symptoms for the last few days.  Tonight states she was having a few drinks at home when she started having a coughing spell.  Only resolved after using her albuterol inhaler.  Reports she coughed up green phlegm during the coughing spell.  States she's had pneumonia in the past, also has a history of bronchitis and is concerned that may be what is occurring now  ABCD intact in triage.

## 2019-11-09 NOTE — ED PROVIDER NOTES
History     Chief Complaint:  Shortness of Breath    HPI   Carlotta Arce is a 54 year old female with a history of asthma who presents with her father for the evaluation of shortness of breath. The patient reports that late this evening she started to experience wheezing and shortness of breath, prompting her to the ED. The patient notes that she also started to develop a productive cough today. She describes that her sputum is green and that her chest is tight. She also notes that tonight she consumed a couple drinks of EtOH. The patient denies other issues.      Allergies:  No known drug allergies      Medications:    Proair  Proventil  Symbicort     Past Medical History:    Kyphoscoliosis and scoliosis  Asthma    Past Surgical History:    Cholecystectomy    Family History:    Asthma  Cancer    Social History:  Smoking status: Current Every Day Smoker, PPD: 0.50  Alcohol use: Yes  Drug use: No   PCP: Casey Gottlieb   Marital Status:   [2]     Review of Systems   Respiratory: Positive for cough, chest tightness, shortness of breath and wheezing.    All other systems reviewed and are negative.      Physical Exam     Patient Vitals for the past 24 hrs:   BP Temp Temp src Heart Rate Resp SpO2 Weight   11/09/19 0027 -- -- -- 83 20 96 % --   11/08/19 2310 (!) 112/90 98  F (36.7  C) Temporal 96 16 99 % 69.2 kg (152 lb 8.9 oz)        Physical Exam  Vital signs and nursing notes reviewed.     Constitutional: laying on gurney appears comfortable  HENT: Oropharynx is clear and moist  Eyes: Conjunctivae are normal bilaterally. Pupils equal  Neck: normal range of motion  Cardiovascular: Normal rate, regular rhythm, normal heart sounds.   Pulmonary/Chest: bilateral expiratory wheezing, occasional cough. No rhonchi or rales. No significant respiratory distress.   Abdominal: Soft. Bowel sounds are normal. No tenderness to palpation. No rebound or guarding.   Musculoskeletal: No joint swelling or edema. No  calf tenderness or leg swelling.   Neurological: Alert and oriented. No focal weakness  Skin: Skin is warm and dry. No rash noted.   Psych: normal affect     Emergency Department Course   Imaging:  Radiographic findings were communicated with the patient and family who voiced understanding of the findings.  Chest XR, PA and LAT  IMPRESSION: Normal heart size and pulmonary vascularity. Lungs are clear. Probable hiatal hernia. This also appeared to be present on the prior study. Thoracolumbar scoliosis. Chest is otherwise negative. No acute findings.  As read by Radiology.    Interventions:  2315, Duoneb, 3 mLs, Nebulization  0000, Duoneb, 3 mLs, Nebulization  0037, Toradol, 15 mg, IM  0037, Deltasone, 40 mg, PO  0101, Duoneb, 3 mLs, Nebulization    Emergency Department Course:  Past medical records, nursing notes, and vitals reviewed.  2346: I performed an exam of the patient and obtained history, as documented above.     IV inserted and blood drawn.     The patient was sent for a chest x ray while in the emergency department, findings above.    0040: I rechecked the patient. Explained findings to patient.     Findings and plan explained to the Patient. Patient discharged home with instructions regarding supportive care, medications, and reasons to return. The importance of close follow-up was reviewed. The patient was prescribed Proventil, Z-Martin, Symbicort, and Deltasone.       Impression & Plan    Medical Decision Making:  Carlotta Arce is a 54 year old female who presents with increased shortness of breath tonight. Patient does have a history of asthma she also continues to smoke cigarettes. She had occasional congestive cough over the past couple days with some cold symptoms. Chest x ray does not show any evidence of pneumonia. She was given prednisone here in the ED. At recheck, she continued to breath comfortably and had improvement of her lung sounds. She is not hypoxic and therefore I do not believe  there is any indication for any further treatment or evaluation in the ED and there is no indication for admission. She describes some parasternal chest discomfort. I suspect this is more musculoskeletal in nature. There is no indication that the patient would have a PE or dangerous cardiopulmonary cause of her symptoms. Advised her to stop smoking. She is to start back up on her Symbicort. I will have her continue prednisone for a few days and will also put her on azithromycin to cover atypical infectious process or bronchitis based on her smoking history. Patient is to follow up with PCP in the next 2-3 days and to return here if worsening.     Diagnosis:    ICD-10-CM    1. Bronchitis J40    2. Moderate asthma with exacerbation, unspecified whether persistent J45.901        Disposition:  Discharged to home    Discharge Medications:  New Prescriptions    ALBUTEROL (PROVENTIL) (2.5 MG/3ML) 0.083% NEB SOLUTION    Take 1 vial (2.5 mg) by nebulization every 4 hours as needed for shortness of breath / dyspnea    AZITHROMYCIN (ZITHROMAX Z-BELLA) 250 MG TABLET    Two tablets on the first day, then one tablet daily for the next 4 days    BUDESONIDE-FORMOTEROL (SYMBICORT) 80-4.5 MCG/ACT INHALER    Inhale 2 puffs into the lungs 2 times daily    PREDNISONE (DELTASONE) 20 MG TABLET    Take 2 tablets (40 mg) by mouth daily for 4 days     Scribe Disclosure:  I, Jonathan Boogie, am serving as a scribe at 11:47 PM on 11/8/2019 to document services personally performed by Lito Kumar MD based on my observations and the provider's statements to me.      Jonathan Boogie  11/8/2019   [facility] EMERGENCY DEPARTMENT       Lito Kumar MD  11/09/19 0202

## 2020-01-02 ENCOUNTER — HOSPITAL ENCOUNTER (EMERGENCY)
Facility: CLINIC | Age: 55
Discharge: HOME | End: 2020-01-02
Attending: PHYSICIAN ASSISTANT | Admitting: PHYSICIAN ASSISTANT
Payer: COMMERCIAL

## 2020-01-02 ENCOUNTER — APPOINTMENT (OUTPATIENT)
Dept: ULTRASOUND IMAGING | Facility: CLINIC | Age: 55
End: 2020-01-02
Attending: EMERGENCY MEDICINE
Payer: COMMERCIAL

## 2020-01-02 VITALS
WEIGHT: 145 LBS | BODY MASS INDEX: 29.23 KG/M2 | SYSTOLIC BLOOD PRESSURE: 123 MMHG | RESPIRATION RATE: 16 BRPM | DIASTOLIC BLOOD PRESSURE: 80 MMHG | OXYGEN SATURATION: 100 % | HEIGHT: 59 IN | TEMPERATURE: 97.9 F

## 2020-01-02 DIAGNOSIS — I82.412 ACUTE DEEP VEIN THROMBOSIS (DVT) OF FEMORAL VEIN OF LEFT LOWER EXTREMITY (H): ICD-10-CM

## 2020-01-02 PROCEDURE — 99284 EMERGENCY DEPT VISIT MOD MDM: CPT | Mod: 25

## 2020-01-02 PROCEDURE — 93971 EXTREMITY STUDY: CPT | Mod: LT

## 2020-01-02 ASSESSMENT — ENCOUNTER SYMPTOMS
COLOR CHANGE: 0
COUGH: 0
SHORTNESS OF BREATH: 0

## 2020-01-02 ASSESSMENT — MIFFLIN-ST. JEOR: SCORE: 1173.35

## 2020-01-02 NOTE — ED PROVIDER NOTES
History     Chief Complaint:  Left Leg Pain    HPI   Carlotta Arce is a 54 year old female who presents for evaluation of left leg pain. Last night, the patient noted increased swelling in her left thigh. She also noted increased pain to the area when she walks or applies pressure. Throughout the night, the patient reports a lot of discomfort preventing her from sleeping; this pain was not resolved with the hot baths. Given she cannot think of any falls or other trauma that may have caused this pain, she presented to Urgent Care this morning however they referred her to the ED without being seen with concern for a DVT. Here, the patient reports she can feel a bump in her left inner thigh. She denies any calf pain/swelling or chest pain. She does note shortness of breath, however it is consistent with her baseline asthma. She has no prior history of blood clot or cancer. She also denies any recent surgery, long car or plane trips, or other immobilizations. She has no history of anticoagulation.     Allergies:  No Known Allergies     Medications:    Albuterol inhaler   Symbicort inhaler    Past Medical History:    Scoliosis   Asthma     Past Surgical History:     x3  Cholecystectomy  Tubal ligation x2    Family History:    Mother: Asthma, Lung cancer  Father: asthma  Daughter: ovarian cancer    Social History:  Marital Status:   [2]  Presents to the ED with .   Patient works as a  in retail.   Positive for tobacco use. Comment: 0.5 PPD.   Positive for alcohol use. Comment: 1-2 times/week.   Negative for drug use.     Review of Systems   Respiratory: Negative for cough and shortness of breath.    Cardiovascular: Positive for leg swelling. Negative for chest pain.   Musculoskeletal:        Left thigh pain   Skin: Negative for color change.   All other systems reviewed and are negative.         Physical Exam     Patient Vitals for the past 24 hrs:   BP Temp Temp src Heart Rate  "Resp SpO2 Height Weight   01/02/20 1340 123/80 97.9  F (36.6  C) Temporal 93 16 100 % 1.499 m (4' 11\") 65.8 kg (145 lb)      Physical Exam  Constitutional: well appearing, no acute distress.   Head: No external signs of trauma noted to head or face.   Eyes: Pupils are equal, round, and reactive to light. Conjunctiva normal.   ENT: MMM. Normal voice.   Neck: normal ROM.   Cardiovascular: Normal rate, regular rhythm, and intact distal pulses.    Respiratory: Effort normal. No respiratory distress. Lungs clear to auscultation bilaterally.   GI: Soft. Non-tender.   Musculoskeletal: No deformities appreciated. Left thigh is mildly swollen and erythematous with palpable cord on medial thigh. No bony tenderness.   Neurological: Alert and Oriented x 3. Speech normal. Moves all extremities equally. Sensation and strength intact in lower extremities bilaterally.   Psychiatric: Appropriate mood, affect, and behavior.   Skin: Skin is warm and dry.       Emergency Department Course   Imaging:  Radiographic findings were communicated with the patient who voiced understanding of the findings.  US Lower Extremity Venous Duplex, left:  Acute DVT involving the left common femoral vein which is  nonocclusive. This is associated with an extensive superficial  thrombophlebitis involving the greater saphenous vein from the mid  calf through the groin. This thrombus extends into the left common  femoral vein. Remaining deep veins left lower extremity show no  evidence of intraluminal thrombus, as per radiology.     Procedures:  None    Emergency Department Course:  Nursing notes and vitals reviewed.   The patient was sent for a left lower extremity ultrasound while in the emergency department, results above.      1452: I performed an exam of the patient as documented above and discussed the results of her workup thus far.     1508: I discussed the anticoagulation dosing and need for follow-up with the patient.     Findings and plan " explained to the Patient. Patient discharged home with instructions regarding supportive care, medications, and reasons to return. The importance of close follow-up was reviewed. The patient was prescribed Xarelto.     I personally reviewed the imaging results with the Patient and answered all related questions prior to discharge.    Impression & Plan      Medical Decision Making:  Carlotta Arce is a 54 year old female who presents for evaluation of left leg pain and swelling.  The workup in the Emergency Department indicates this is due to deep venous thrombosis and superficial thrombophlebitis of the greater saphenous vein.  This was discussed with the patient.  There are no symptoms to suggest this has progressed to pulmonary embolism.  After the risks and benefits of anticoagulation options were discussed, the patient opted to begin xarelto.  There are no contraindications to anticoagulation. Patient understands the risk/benefit ratio to this therapy and the possibility of serious and/or life-threatening hemorrhage. There is no indication at this point for thrombolysis or contacting the interventional team for directed thrombolytics. She is appropriate for outpatient management at this time with close follow-up with PCP. Instructed to return to the ED for any new or worsening symptoms.     Diagnosis:    ICD-10-CM    1. Acute deep vein thrombosis (DVT) of femoral vein of left lower extremity (H) I82.412        Disposition:  discharged to home    Discharge Medications:  New Prescriptions    RIVAROXABAN ANTICOAGULANT (XARELTO ANTICOAGULANT) 15 MG TABS TABLET    Take 1 tablet (15 mg) by mouth 2 times daily (with meals) for 21 days       Scribe Disclosure:  I, Merissa Bocanegra, am serving as a scribe on 1/2/2020 at 2:59 PM to personally document services performed by Francisca Collins PA-C based on my observations and the provider's statements to me.      1/2/2020   [facility] EMERGENCY  DEPARTMENT       [facility], Francisca Daniels PA-C  01/02/20 1409

## 2020-01-02 NOTE — LETTER
January 2, 2020      To Whom It May Concern:      Carlotta Arce was seen in our Emergency Department today, 01/02/20.  I expect her condition to improve over the next 2-3 days.  She may return to work when improved.    Sincerely,        Mariana LYNCH RN

## 2020-01-02 NOTE — ED AVS SNAPSHOT
[facility] Emergency Department  [address]  [facility] [address]  Phone:  [phone]  Fax:  [phone]                                    Carlotta Arce   MRN: [id number]    Department:  [facility] Emergency Department   Date of Visit:  1/2/2020           After Visit Summary Signature Page    I have received my discharge instructions, and my questions have been answered. I have discussed any challenges I see with this plan with the nurse or doctor.    ..........................................................................................................................................  Patient/Patient Representative Signature      ..........................................................................................................................................  Patient Representative Print Name and Relationship to Patient    ..................................................               ................................................  Date                                   Time    ..........................................................................................................................................  Reviewed by Signature/Title    ...................................................              ..............................................  Date                                               Time          22EPIC Rev 08/18       
Yes

## 2020-01-02 NOTE — LETTER
January 2, 2020      To Whom It May Concern:      Carlotta Arce was seen in our Emergency Department today, 01/02/20.  Please excuse her from work today and tomorrow. She may return to work on 1/4/20.     Sincerely,          Francisca Collins PA-C

## 2020-01-16 ENCOUNTER — APPOINTMENT (OUTPATIENT)
Dept: ULTRASOUND IMAGING | Facility: CLINIC | Age: 55
End: 2020-01-16
Attending: EMERGENCY MEDICINE
Payer: COMMERCIAL

## 2020-01-16 ENCOUNTER — HOSPITAL ENCOUNTER (EMERGENCY)
Facility: CLINIC | Age: 55
Discharge: HOME | End: 2020-01-17
Attending: EMERGENCY MEDICINE | Admitting: EMERGENCY MEDICINE
Payer: COMMERCIAL

## 2020-01-16 ENCOUNTER — NURSE TRIAGE (OUTPATIENT)
Dept: NURSING | Facility: CLINIC | Age: 55
End: 2020-01-16

## 2020-01-16 DIAGNOSIS — N93.9 VAGINAL BLEEDING: ICD-10-CM

## 2020-01-16 PROCEDURE — 25000132 ZZH RX MED GY IP 250 OP 250 PS 637: Performed by: EMERGENCY MEDICINE

## 2020-01-16 PROCEDURE — 76856 US EXAM PELVIC COMPLETE: CPT

## 2020-01-16 PROCEDURE — 99284 EMERGENCY DEPT VISIT MOD MDM: CPT | Mod: 25

## 2020-01-16 RX ORDER — OXYCODONE HYDROCHLORIDE 5 MG/1
5 TABLET ORAL ONCE
Status: COMPLETED | OUTPATIENT
Start: 2020-01-16 | End: 2020-01-16

## 2020-01-16 RX ORDER — SODIUM CHLORIDE 9 MG/ML
1000 INJECTION, SOLUTION INTRAVENOUS CONTINUOUS
Status: DISCONTINUED | OUTPATIENT
Start: 2020-01-16 | End: 2020-01-17 | Stop reason: HOSPADM

## 2020-01-16 RX ADMIN — OXYCODONE HYDROCHLORIDE 5 MG: 5 TABLET ORAL at 23:15

## 2020-01-16 ASSESSMENT — ENCOUNTER SYMPTOMS
ANAL BLEEDING: 0
DYSURIA: 0
HEMATURIA: 0
BLOOD IN STOOL: 0
LIGHT-HEADEDNESS: 0
DIZZINESS: 0
ABDOMINAL PAIN: 1

## 2020-01-16 ASSESSMENT — MIFFLIN-ST. JEOR: SCORE: 1159.75

## 2020-01-16 NOTE — ED AVS SNAPSHOT
[facility] Emergency Department  [address]  [facility] [address]  Phone:  [phone]  Fax:  [phone]                                    Carlotta Arce   MRN: [id number]    Department:  [facility] Emergency Department   Date of Visit:  1/16/2020           After Visit Summary Signature Page    I have received my discharge instructions, and my questions have been answered. I have discussed any challenges I see with this plan with the nurse or doctor.    ..........................................................................................................................................  Patient/Patient Representative Signature      ..........................................................................................................................................  Patient Representative Print Name and Relationship to Patient    ..................................................               ................................................  Date                                   Time    ..........................................................................................................................................  Reviewed by Signature/Title    ...................................................              ..............................................  Date                                               Time          22EPIC Rev 08/18

## 2020-01-16 NOTE — LETTER
January 17, 2020      To Whom It May Concern:      Carlotta Arce was seen in our Emergency Department today, 01/17/20.  Please excuse her from work today due to medical concern.    Sincerely,        Ayo Velasco MD

## 2020-01-17 VITALS
SYSTOLIC BLOOD PRESSURE: 129 MMHG | HEART RATE: 60 BPM | OXYGEN SATURATION: 97 % | RESPIRATION RATE: 16 BRPM | BODY MASS INDEX: 30.54 KG/M2 | DIASTOLIC BLOOD PRESSURE: 70 MMHG | HEIGHT: 58 IN | TEMPERATURE: 98.3 F | WEIGHT: 145.5 LBS

## 2020-01-17 LAB
ALBUMIN SERPL-MCNC: 3.1 G/DL (ref 3.4–5)
ALBUMIN UR-MCNC: NEGATIVE MG/DL
ALP SERPL-CCNC: 83 U/L (ref 40–150)
ALT SERPL W P-5'-P-CCNC: 14 U/L (ref 0–50)
AMORPH CRY #/AREA URNS HPF: ABNORMAL /HPF
ANION GAP SERPL CALCULATED.3IONS-SCNC: 3 MMOL/L (ref 3–14)
APPEARANCE UR: ABNORMAL
AST SERPL W P-5'-P-CCNC: 18 U/L (ref 0–45)
B-HCG FREE SERPL-ACNC: <5 IU/L
BACTERIA #/AREA URNS HPF: ABNORMAL /HPF
BASOPHILS # BLD AUTO: 0.1 10E9/L (ref 0–0.2)
BASOPHILS NFR BLD AUTO: 1.6 %
BILIRUB SERPL-MCNC: 0.2 MG/DL (ref 0.2–1.3)
BILIRUB UR QL STRIP: NEGATIVE
BUN SERPL-MCNC: 16 MG/DL (ref 7–30)
C TRACH DNA SPEC QL NAA+PROBE: NEGATIVE
CALCIUM SERPL-MCNC: 8.6 MG/DL (ref 8.5–10.1)
CHLORIDE SERPL-SCNC: 103 MMOL/L (ref 94–109)
CO2 SERPL-SCNC: 32 MMOL/L (ref 20–32)
COLOR UR AUTO: YELLOW
CREAT SERPL-MCNC: 0.43 MG/DL (ref 0.52–1.04)
DIFFERENTIAL METHOD BLD: ABNORMAL
EOSINOPHIL # BLD AUTO: 0.2 10E9/L (ref 0–0.7)
EOSINOPHIL NFR BLD AUTO: 3.6 %
ERYTHROCYTE [DISTWIDTH] IN BLOOD BY AUTOMATED COUNT: 20 % (ref 10–15)
GFR SERPL CREATININE-BSD FRML MDRD: >90 ML/MIN/{1.73_M2}
GLUCOSE SERPL-MCNC: 100 MG/DL (ref 70–99)
GLUCOSE UR STRIP-MCNC: NEGATIVE MG/DL
HCT VFR BLD AUTO: 27 % (ref 35–47)
HGB BLD-MCNC: 7.3 G/DL (ref 11.7–15.7)
HGB UR QL STRIP: ABNORMAL
IMM GRANULOCYTES # BLD: 0 10E9/L (ref 0–0.4)
IMM GRANULOCYTES NFR BLD: 0.2 %
INR PPP: 1.05 (ref 0.86–1.14)
KETONES UR STRIP-MCNC: NEGATIVE MG/DL
LEUKOCYTE ESTERASE UR QL STRIP: NEGATIVE
LIPASE SERPL-CCNC: 250 U/L (ref 73–393)
LYMPHOCYTES # BLD AUTO: 2.4 10E9/L (ref 0.8–5.3)
LYMPHOCYTES NFR BLD AUTO: 38.7 %
MCH RBC QN AUTO: 17.8 PG (ref 26.5–33)
MCHC RBC AUTO-ENTMCNC: 27 G/DL (ref 31.5–36.5)
MCV RBC AUTO: 66 FL (ref 78–100)
MONOCYTES # BLD AUTO: 0.8 10E9/L (ref 0–1.3)
MONOCYTES NFR BLD AUTO: 12.9 %
MUCOUS THREADS #/AREA URNS LPF: PRESENT /LPF
N GONORRHOEA DNA SPEC QL NAA+PROBE: NEGATIVE
NEUTROPHILS # BLD AUTO: 2.6 10E9/L (ref 1.6–8.3)
NEUTROPHILS NFR BLD AUTO: 43 %
NITRATE UR QL: NEGATIVE
NRBC # BLD AUTO: 0 10*3/UL
NRBC BLD AUTO-RTO: 0 /100
PH UR STRIP: 8.5 PH (ref 5–7)
PLATELET # BLD AUTO: 421 10E9/L (ref 150–450)
POTASSIUM SERPL-SCNC: 3.4 MMOL/L (ref 3.4–5.3)
PROT SERPL-MCNC: 7 G/DL (ref 6.8–8.8)
RBC # BLD AUTO: 4.09 10E12/L (ref 3.8–5.2)
RBC #/AREA URNS AUTO: 0 /HPF (ref 0–2)
SODIUM SERPL-SCNC: 138 MMOL/L (ref 133–144)
SOURCE: ABNORMAL
SP GR UR STRIP: 1.01 (ref 1–1.03)
SPECIMEN SOURCE: ABNORMAL
SPECIMEN SOURCE: NORMAL
SPECIMEN SOURCE: NORMAL
SQUAMOUS #/AREA URNS AUTO: 1 /HPF (ref 0–1)
UROBILINOGEN UR STRIP-MCNC: NORMAL MG/DL (ref 0–2)
WBC # BLD AUTO: 6.1 10E9/L (ref 4–11)
WBC #/AREA URNS AUTO: 4 /HPF (ref 0–5)
WET PREP SPEC: ABNORMAL

## 2020-01-17 PROCEDURE — 87210 SMEAR WET MOUNT SALINE/INK: CPT | Performed by: EMERGENCY MEDICINE

## 2020-01-17 PROCEDURE — 83690 ASSAY OF LIPASE: CPT | Performed by: EMERGENCY MEDICINE

## 2020-01-17 PROCEDURE — 81001 URINALYSIS AUTO W/SCOPE: CPT | Performed by: EMERGENCY MEDICINE

## 2020-01-17 PROCEDURE — 87591 N.GONORRHOEAE DNA AMP PROB: CPT | Performed by: EMERGENCY MEDICINE

## 2020-01-17 PROCEDURE — 85025 COMPLETE CBC W/AUTO DIFF WBC: CPT | Performed by: EMERGENCY MEDICINE

## 2020-01-17 PROCEDURE — 87491 CHLMYD TRACH DNA AMP PROBE: CPT | Performed by: EMERGENCY MEDICINE

## 2020-01-17 PROCEDURE — 85610 PROTHROMBIN TIME: CPT | Performed by: EMERGENCY MEDICINE

## 2020-01-17 PROCEDURE — 80053 COMPREHEN METABOLIC PANEL: CPT | Performed by: EMERGENCY MEDICINE

## 2020-01-17 PROCEDURE — 84702 CHORIONIC GONADOTROPIN TEST: CPT

## 2020-01-17 RX ORDER — OXYCODONE HYDROCHLORIDE 5 MG/1
5 TABLET ORAL EVERY 6 HOURS PRN
Qty: 12 TABLET | Refills: 0 | Status: SHIPPED | OUTPATIENT
Start: 2020-01-17 | End: 2020-12-14

## 2020-01-17 RX ORDER — MEDROXYPROGESTERONE ACETATE 10 MG
10 TABLET ORAL DAILY
Qty: 10 TABLET | Refills: 0 | Status: SHIPPED | OUTPATIENT
Start: 2020-01-17 | End: 2020-01-27

## 2020-01-17 NOTE — TELEPHONE ENCOUNTER
"\"I was diagnosed with a blood clot in my leg in early January\".  Caller is post menopausal, has not had period in two years and she started having vaginal bleeding today and has passed some clots.     Also c/o abdominal cramping and leg cramping where blood clot was dx.    Reason for Disposition    Patient sounds very sick or weak to the triager    Additional Information    Negative: Shock suspected (e.g., cold/pale/clammy skin, too weak to stand, low BP, rapid pulse)    Negative: Difficult to awaken or acting confused (e.g., disoriented, slurred speech)    Negative: Passed out (i.e., lost consciousness, collapsed and was not responding)    Negative: Sounds like a life-threatening emergency to the triager    Negative: Followed a genital area injury    Negative: [1] Vaginal discharge is main symptom AND [2] small amount of blood    Negative: SEVERE abdominal pain    Negative: SEVERE dizziness (e.g., unable to stand, requires support to walk, feels like passing out now)    Negative: Sexual assault or rape    Negative: SEVERE vaginal bleeding (i.e., soaking 2 pads or tampons per hour and present 2 or more hours)    Protocols used: VAGINAL BLEEDING - [id number]    Aure Zayas RN  [facility]      "

## 2020-01-17 NOTE — ED PROVIDER NOTES
History     Chief Complaint:  Vaginal Bleeding    HPI   Carlotta Arce is a 54 year old female who presents with vaginal bleeding. The patient reports that 2 weeks ago she was diagnosed with a DVT in her left leg and was prescribed Xarelto. The patient has since been taking this as prescribed and was not discharged home with any pain medications. 5 days ago she then started to notice vaginal bleeding that she states was light pink in appearance. She details that she has not had a menstrual cycle for 2-3 years and she had just presumed she was going to menopause with the light bleeding. However, she expresses that the bleeding has continued and tonight around 2000 she noted a blood clot the size of a palma. The patient endorses associated symptoms of abdominal cramping and left leg cramping where she was diagnosed with the clot. The patient has not had any other clots since earlier tonight. She denies rectal bleeding, hematuria, dysuria, dizziness, lightheadedness, blood in stool, or other symptoms. She does not follow OB/GYN.     Allergies:  No known drug allergies.      Medications:    Albuterol   Symbicort   Xarelto     Past Medical History:    Asthma   kyphoscoliosis and scoliosis  allergies    Tobacco use disorder     Past Surgical History:    c section x3  Choleystectomy  Tubal ligation x2     Family History:    Mother: asthma, lung cancer  Father: asthma  Child: ovarian cancer     Social History:  The patient was accompanied to the ED by S.O..  Smoking Status: Current Every Day Smoker  Smokeless Tobacco: Never Used  Alcohol Use: Positive  Drug Use: Negative  PCP: Casey Gottlieb   Marital Status:        Review of Systems   Gastrointestinal: Positive for abdominal pain. Negative for anal bleeding and blood in stool.   Genitourinary: Positive for vaginal bleeding. Negative for dysuria and hematuria.   Musculoskeletal:        Left leg cramping   Neurological: Negative for dizziness and  "light-headedness.   All other systems reviewed and are negative.    Physical Exam     Patient Vitals for the past 24 hrs:   BP Temp Temp src Pulse Heart Rate Resp SpO2 Height Weight   01/17/20 0226 -- -- -- -- -- 16 -- -- --   01/17/20 0145 129/70 -- -- 60 -- -- 97 % -- --   01/17/20 0130 129/89 -- -- 56 -- -- 97 % -- --   01/17/20 0115 119/52 -- -- 52 -- -- 98 % -- --   01/17/20 0100 135/85 -- -- 57 -- -- 96 % -- --   01/17/20 0030 (!) 146/74 -- -- 64 -- -- 97 % -- --   01/17/20 0015 (!) 149/71 -- -- 63 -- -- 98 % -- --   01/16/20 2210 (!) 144/79 98.3  F (36.8  C) Oral 77 77 18 100 % 1.473 m (4' 10\") 66 kg (145 lb 8.1 oz)      Physical Exam  VS: Reviewed per above  HENT: Mucous membranes moist  EYES: sclera anicteric  CV: Rate as noted, regular rhythm.   RESP: Effort normal. Breath sounds are normal bilaterally.  GI: no tenderness/rebound/guarding, not distended.  Chaperoned pelvic exam: Scant blood in the vaginal vault.   NEURO: Alert, moving all extremities  MSK: No deformity of the extremities  SKIN: Warm and dry    Emergency Department Course     Imaging:  Radiology findings were communicated with the patient who voiced understanding of the findings.    US Pelvic Complete with Transvaginal  1. Endometrium measures 7 mm, slightly prominent for a postmenopausal woman with bleeding. Further follow-up/evaluation recommended.    2. Slightly heterogeneous uterus without discrete mass.    3. Trace pelvic fluid.  Reading per radiology      Laboratory:  Laboratory findings were communicated with the patient who voiced understanding of the findings.    Wet Prep: Clue Cells seen (A), o/w WNL.   Neisseria gonorrheae: Pending   Chlamydia Trachomatis: Pending     UA: Blood small (A), pH 8.5 (H), Bacteria many (A), Mucous present (A), Amorphous Crystals few (A), o/w WNL.    CBC: WBC 6.1, HGB 7.3 (L),   CMP: Glucose 100 (H), Albumin 3.1 (L), o/w WNL (Creatinine 0.43 (L))    Lipase: 250  INR: 1.05     ISTAT HCG " Quantitative: <5.0     Interventions:  2315 oxycodone 5 mg PO     Emergency Department Course:    2244 Nursing notes and vitals reviewed. I performed an exam of the patient as documented above.     2346 The patient was sent for a US while in the emergency department, results above.      0009 IV was inserted and blood was drawn for laboratory testing, results above.     0058 A wet prep sample was obtained for laboratory testing as documented above.     0102 The patient provided a urine sample here in the emergency department. This was sent for laboratory testing, findings above.     0145 I spoke with Dr. Nicole of the OB/GYN service regarding patient's presentation, findings, and plan of care.     0208 I spoke with Dr. Nicole of the OB/GYN service regarding patient's presentation, findings, and plan of care.    0217 Prior to discharge, I personally reviewed the results with the patient and all related questions were answered. The patient verbalized understanding and is amenable to plan.     Impression & Plan      Medical Decision Making:  Carlotta Arce is a 54 year old female who presents for post/scot menopausal vaginal bleeding.  Vital signs are within normal limits.  Exam with out peritoneal abdominal signs and only scant blood in the vaginal vault.  Hemoglobin is 7.3, which is similar to prior values.  Pelvic ultrasound reveals slightly thickened endometrial lining but no other acute concern.  No evidence of pregnancy.  No indication for blood transfusion at this point time.  Patient was given oxycodone for ongoing pain associated with a left lower extremity DVT.  Fortunately patient is on Xarelto for this DVT which is likely increasing her tendency to bleed.  Discussed with OB/GYN and plan to start progesterone 10 mg daily for 10 days and follow-up with in the next few days for likely endometrial biopsy.  Patient was discharged with some oxycodone as well for ongoing pain associated with the left lower  extremity DVT.  Return precautions were discussed prior to discharge.    Diagnosis:    ICD-10-CM    1. Vaginal bleeding N93.9      Disposition:   The patient is discharged to home.     Discharge Medications:  Discharge Medication List as of 1/17/2020  2:21 AM      START taking these medications    Details   medroxyPROGESTERone (PROVERA) 10 MG tablet Take 1 tablet (10 mg) by mouth daily for 10 days, Disp-10 tablet, R-0, Local Print      oxyCODONE (ROXICODONE) 5 MG tablet Take 1 tablet (5 mg) by mouth every 6 hours as needed for pain, Disp-12 tablet, R-0, Local Print           Scribe Disclosure:  I, Orla Severson, am serving as a scribe at 11:12 PM on 1/16/2020 to document services personally performed by Ayo Velasco MD based on my observations and the provider's statements to me.   [facility] EMERGENCY DEPARTMENT       Ayo Velasco MD  01/17/20 0331

## 2020-01-17 NOTE — ED TRIAGE NOTES
Pt reports being on xarelto for DVT in left leg, which is painful. She has vaginal bleeding and reports has not had a period in 2 years

## 2020-01-23 ENCOUNTER — OFFICE VISIT (OUTPATIENT)
Dept: OBGYN | Facility: CLINIC | Age: 55
End: 2020-01-23
Payer: COMMERCIAL

## 2020-01-23 VITALS
HEART RATE: 83 BPM | WEIGHT: 149 LBS | DIASTOLIC BLOOD PRESSURE: 68 MMHG | SYSTOLIC BLOOD PRESSURE: 102 MMHG | BODY MASS INDEX: 31.14 KG/M2

## 2020-01-23 DIAGNOSIS — N95.0 POSTMENOPAUSAL BLEEDING: Primary | ICD-10-CM

## 2020-01-23 PROCEDURE — 88305 TISSUE EXAM BY PATHOLOGIST: CPT | Performed by: OBSTETRICS & GYNECOLOGY

## 2020-01-23 PROCEDURE — 99204 OFFICE O/P NEW MOD 45 MIN: CPT | Mod: 25 | Performed by: OBSTETRICS & GYNECOLOGY

## 2020-01-23 PROCEDURE — 58100 BIOPSY OF UTERUS LINING: CPT | Performed by: OBSTETRICS & GYNECOLOGY

## 2020-01-23 NOTE — PROGRESS NOTES
ER follow-up visit.     Ms. Carlotta Arce 54 year old P4 ( x 3,  x 1) who presents for ER follow-up of postmenopausal bleeding.   She was seen in the ER on 2020. Work-up revealed Hgb 7.3 but review of her past hemoglobins reveal chronic anemia.  A pelvic ultrasound showed no structural lesions ie endometrial polyps/submucosal fibroids but she did have an endometrial stripe of 7 mm. She was ultimately discharge with 10 day course of Provera and she acknowledges no recurrence of bleeding since starting her provera. She still is taking, in fact.   Prior to onset of her bleeding, a week prior to be exact, she had been started on Xarelto for a left lower extremity DVT. She suspects that her starting the blood thinner is what contributed to the onset of her vaginal bleeding.   Of note, she has been menopausal for 4-5 years. She does admits to scant pink tinged bleeding since that time that she only sees with wiping after she voids, but she did not think anything of it.   She denies hx of hormone replacement therapy use.   Otherwise, she reports normal bladder and bowel habits.   Denies any pain at this time.       Past Medical History:   Diagnosis Date     Kyphoscoliosis and scoliosis      Moderate persistent asthma        Past Surgical History:   Procedure Laterality Date      SECTION      x3     CHOLECYSTECTOMY, LAPOROSCOPIC       TUBAL LIGATION      Became pregnant after this first tubal ligation     TUBAL LIGATION      Repeat       Current Outpatient Medications   Medication     albuterol (PROAIR HFA) 108 (90 Base) MCG/ACT inhaler     albuterol (PROVENTIL) (2.5 MG/3ML) 0.083% neb solution     albuterol (PROVENTIL) (2.5 MG/3ML) 0.083% neb solution     budesonide-formoterol (SYMBICORT) 80-4.5 MCG/ACT Inhaler     budesonide-formoterol (SYMBICORT) 80-4.5 MCG/ACT Inhaler     medroxyPROGESTERone (PROVERA) 10 MG tablet     oxyCODONE (ROXICODONE) 5 MG tablet     rivaroxaban  ANTICOAGULANT (XARELTO ANTICOAGULANT) 15 MG TABS tablet     No current facility-administered medications for this visit.        No Known Allergies    Social History     Tobacco Use     Smoking status: Current Every Day Smoker     Packs/day: 0.50     Years: 21.00     Pack years: 10.50     Types: Cigarettes     Smokeless tobacco: Never Used   Substance Use Topics     Alcohol use: Yes     Comment: 1-2x/wk     Drug use: No       Family History   Problem Relation Age of Onset     Asthma Mother      Cancer Mother         lung ca     Asthma Father      Cancer Child         Ovarian     Diabetes No family hx of      C.A.D. No family hx of      Hypertension No family hx of      Breast Cancer No family hx of      Cancer - colorectal No family hx of        C: NEGATIVE for fever, chills, change in weight  HEENT: NEGATIVE for visual changes, runny nose, epistaxis, ear pain, tinnitus, tooth ache, sore throat, difficulty with swallowing, sinus pain  R: NEGATIVE for significant cough or SOB  CV: NEGATIVE for chest pain, palpitations or peripheral edema  BREAST: NEGATIVE For soreness, pain, lumps or nipple discharge  GI: NEGATIVE for nausea, abdominal pain, heartburn, or change in bowel habits  : NEGATIVE for frequency, dysuria, hematuria, vaginal discharge, or irregular bleeding  Neuro: NEGATIVE for numbness, tingling, focal weakness, or headache  INT: NEGATIVE for rashes, lesions or pruritis   PSYCH: NEGATIVE for anxiety, depression, or halluciinations    Exam:   My medical assistant, Radha Brantley CMA, was present as a chaperone for entire clinic visit including the physical exam.  /68 (BP Location: Right arm, Patient Position: Chair, Cuff Size: Adult Regular)   Pulse 83   Wt 67.6 kg (149 lb)   Breastfeeding No   BMI 31.14 kg/m    General Appearance: Well nourished, well developed female, NAD, AOx3  Neurological: Mental Status Normal and Station and Gait Normal   Skin: Normal skin turgor  HEET: Atraumatic,  normocephalic, EOMI  Neck: Supple,no adenopathy,thyroid normal  Lungs: Good respiratory effort  Abdomen: Soft, NT, ND, no masses  Pelvic: Normal external female genitalia.  No external lesions, normal hair distribution, no adenopathy. Speculum exam reveals vaginal epithelium well rugated with no abnormal discharge. Cervix appears smooth, pink, with no visible lesions. Bimanual exam reveals normal size uterus, anteverted, non-tender, and mobile. No adnexal masses or tenderness. No cervical motion tenderness.   Extremities: No clubbing, no cyanosis and no edema    Procedure: Endometrial biopsy  After informed consent was obtained from the patient, a speculum was placed in the vagina to visualize the cervix.  The cervix was then swabbed with a betadine prep.  Tenaculum was applied to the anterior cervical lip. Endometrial biopsy pipelle was passed through the cervical os and scant tissue obtained.  Uterus sounded to 8 cm.  Tenaculum was removed and sites were hemostatic. There were no complications. The patient tolerated the procedure well with a minimal amount of cramping noted.  Specimen was sent to pathology.    Imaging:   Pelvic US 1/2020  FINDINGS: Transabdominal and transvaginal imaging were performed. Uterus measures 8.5 x 4.1 x 4.8 cm. Uterus is slightly heterogeneous without discrete uterine mass. Endometrial stripe measures 7 mm in thickness. Right ovary negative. Left ovary not seen   which may be due to size or location. No suspicious adnexal masses. Trace pelvic fluid.                                                                 IMPRESSION:  1. Endometrium measures 7 mm, slightly prominent for a postmenopausal woman with bleeding. Further follow-up/evaluation recommended.   2. Slightly heterogeneous uterus without discrete mass.   3. Trace pelvic fluid.  Signed by:  Vincenzo Lind MD    I, personally, reviewed ultrasound images and findings with patient    A/P:   1) Postmenopausal bleeding  --  although I acknowledge that there is the possibility that onset of her postmenopausal bleeding could be explained by her initiating her blood thinning medication, I also emphasized not ruling out endometrial hyperplasia/malignancy for the cause of her postmenopausal bleeding  -- therefore, endometrial sampling recommended and patient agreed; post-biopsy bleeding precautions reviewed  -- if bleeding continued to persist in the setting of benign endometrial biopsy due to ongoing blood thinning medication use, then recommended progestin therapy to help alleviate bleeding  -- will defer her need for ongoing blood thinning medication use with appropriate providers; she reports that she has a follow-up appt with her PCP next week; discussed the possibility that she may have to be referred to a hematology for them to provide input as to how long she would need to be anti-coagulated    Total time spent was 45 minutes; greater than 50% of time was spent in counseling and/or coordination of care for the above listed diagnoses, not including time spent on the procedure.    Earl Hernandez MD  [facility]

## 2020-01-23 NOTE — NURSING NOTE
"Chief Complaint   Patient presents with     Abnormal Uterine Bleeding     ED on 2020  stopped 2 days ago       Initial /68 (BP Location: Right arm, Patient Position: Chair, Cuff Size: Adult Regular)   Pulse 83   Wt 67.6 kg (149 lb)   Breastfeeding No   BMI 31.14 kg/m   Estimated body mass index is 31.14 kg/m  as calculated from the following:    Height as of 20: 1.473 m (4' 10\").    Weight as of this encounter: 67.6 kg (149 lb).  BP completed using cuff size: regular    Questioned patient about current smoking habits.  Pt. currently smokes.  Advised about smoking cessation.          The following HM Due: NONE      Radha Brantley, WIN on 2020 at 9:29 AM         "

## 2020-01-27 LAB — COPATH REPORT: NORMAL

## 2020-02-27 ENCOUNTER — TELEPHONE (OUTPATIENT)
Dept: INTERNAL MEDICINE | Facility: CLINIC | Age: 55
End: 2020-02-27

## 2020-02-27 NOTE — LETTER
[facility]  [address] [address]  [phone]  February 27, 2020    Carlotta Arce  [address]  [facility] [address]    Dear Carlotta,    We care about your health and based on a review of your medical records, recommend the the following, to better manage your health:      You are in particular need of attention regarding:  -Colon Cancer Screening  -Cervical Cancer Screening  -Wellness (Physical) Visit     I am recommending that you:     -schedule a WELLNESS (Physical) APPOINTMENT with me.   I will check fasting labs the same day - nothing to eat except water and meds for 8-10 hours prior. (If you go elsewhere for Wellness visits then please disregard this reminder.)    -schedule a COLONOSCOPY to look for colon cancer (due every 10 years or 5 years in higher risk situations.)        Colon cancer is now the second leading cause of cancer-related deaths in the [place] for both men and women and there are over 130,000 new cases and 50,000 deaths per year from colon cancer.  Colonoscopies can prevent 90-95% of these deaths.  Problem lesions can be removed before they ever become cancer.  This test is not only looking for cancer, but also getting rid of precancerious lesions.    If you are under/uninsured, we recommend you contact the [employer] program. [employer] is a free colorectal cancer screening program that provides colonoscopies for eligible under/uninsured [place] men and women. If you are interested in receiving a free colonoscopy, please call [employer] at [phone] (mention code [employer]) to see if you re eligible.      If you do not wish to do a colonoscopy or cannot afford to do one, at this time, there is another option. It is called a FIT test or Fecal Immunochemical Occult Blood Test (take home stool sample kit).  It does not replace the colonoscopy for colorectal cancer screening, but it can  detect hidden bleeding in the lower colon.  It does need to be repeated every year and if a positive result is obtained, you would be referred for a colonoscopy.          If you have completed either one of these tests at another facility, please call with the details of when and where the tests were done and if they were normal or not. Or have the records sent to our clinic so that we can best coordinate your care.    -schedule a PAP SMEAR EXAM which is due.  Please disregard this reminder if you have had this exam elsewhere within the last year.  It would be helpful for us to have a copy of your recent pap smear report in our file so that we can best coordinate your care.    If you are under/uninsured, we recommend you contact the [employer] [facility]. They offer pap smears at no charge or on a sliding fee charge. You can schedule with them at [phone]. Please have them send us the results.      Here is a list of Health Maintenance topics that are due now or due soon:  Health Maintenance Due   Topic Date Due     Colonoscopy  07/13/1977     HIV Screening  07/13/1982     Pneumococcal Vaccine (1 of 1 - PPSV23) 07/13/1986     Preventive Care Visit  10/09/2010     Cholesterol Lab  07/13/2012     PAP Smear  10/09/2012     Diptheria Tetanus Pertussis (DTAP/TDAP/TD) Vaccine (2 - Td) 08/01/2013     Zoster (Shingles) Vaccine (1 of 2) 07/13/2017     Flu Vaccine (1) 09/01/2019     Asthma Control Test  09/18/2019     PHQ-2  01/01/2020     Asthma Action Plan - yearly  03/18/2020       Please call us at [phone] or [phone] (or use [employer]) to address the above recommendations.     Thank you for trusting [facility].  We appreciate the opportunity to serve you and look forward to supporting your healthcare needs in the future.    If you have (or plan to have) any of these tests done at a facility other than a [facility] or a [facility], please have the results from these tests sent to your primary  physician at [facility].    Healthy Regards,    Gabriel Gottlieb MD/Catrina Suarez, CMA

## 2020-02-27 NOTE — TELEPHONE ENCOUNTER
Panel Management Review      Patient Active Problem List   Diagnosis     Allergic state     Moderate persistent asthma     Tobacco use disorder     FAMILY HX OVARIAN MALIGNANCY-DTR     CARDIOVASCULAR SCREENING; LDL GOAL LESS THAN 160     Health Care Home          Composite cancer screening  Chart review shows that this patient is due/due soon for the following Pap Smear and Colonoscopy  Summary:    Patient is due/failing the following:   COLONOSCOPY, PAP and PHYSICAL    Action needed:   Patient needs office visit for Physical, Colonoscopy, and Pap.    Type of outreach:    Sent letter.    Questions for provider review:    None                                                                                                                                    Catrina Suarez, CMA

## 2020-06-09 ENCOUNTER — NURSE TRIAGE (OUTPATIENT)
Dept: NURSING | Facility: CLINIC | Age: 55
End: 2020-06-09

## 2020-06-10 NOTE — TELEPHONE ENCOUNTER
Patient calling [facility] requesting a note for work. States she is expected to wear a mask at work but because of her asthma, she has difficulty breathing when wearing the mask and needs a provider's note to allow her to not wear it. Patient denies having symptoms right now. Informed [facility] doctors do not write provider notes for patient without being seen. Advised to follow up with her primary care provider. Informed the mask is also to protect her from the virus.     Nicholas Benito RN  [facility]

## 2020-10-14 ENCOUNTER — APPOINTMENT (OUTPATIENT)
Dept: GENERAL RADIOLOGY | Facility: CLINIC | Age: 55
End: 2020-10-14
Attending: NURSE PRACTITIONER

## 2020-10-14 ENCOUNTER — HOSPITAL ENCOUNTER (EMERGENCY)
Facility: CLINIC | Age: 55
Discharge: HOME | End: 2020-10-15
Attending: PHYSICIAN ASSISTANT | Admitting: PHYSICIAN ASSISTANT

## 2020-10-14 DIAGNOSIS — J45.901 EXACERBATION OF ASTHMA, UNSPECIFIED ASTHMA SEVERITY, UNSPECIFIED WHETHER PERSISTENT: ICD-10-CM

## 2020-10-14 DIAGNOSIS — J45.901 ASTHMA EXACERBATION: ICD-10-CM

## 2020-10-14 LAB
ANION GAP SERPL CALCULATED.3IONS-SCNC: 6 MMOL/L (ref 3–14)
BASOPHILS # BLD AUTO: 0.1 10E9/L (ref 0–0.2)
BASOPHILS NFR BLD AUTO: 1.8 %
BUN SERPL-MCNC: 13 MG/DL (ref 7–30)
CALCIUM SERPL-MCNC: 8.3 MG/DL (ref 8.5–10.1)
CHLORIDE SERPL-SCNC: 104 MMOL/L (ref 94–109)
CO2 SERPL-SCNC: 30 MMOL/L (ref 20–32)
CREAT SERPL-MCNC: 0.56 MG/DL (ref 0.52–1.04)
DIFFERENTIAL METHOD BLD: ABNORMAL
EOSINOPHIL # BLD AUTO: 0.2 10E9/L (ref 0–0.7)
EOSINOPHIL NFR BLD AUTO: 2.7 %
ERYTHROCYTE [DISTWIDTH] IN BLOOD BY AUTOMATED COUNT: 20.2 % (ref 10–15)
GFR SERPL CREATININE-BSD FRML MDRD: >90 ML/MIN/{1.73_M2}
GLUCOSE SERPL-MCNC: 112 MG/DL (ref 70–99)
HCT VFR BLD AUTO: 33 % (ref 35–47)
HGB BLD-MCNC: 8.4 G/DL (ref 11.7–15.7)
IMM GRANULOCYTES # BLD: 0 10E9/L (ref 0–0.4)
IMM GRANULOCYTES NFR BLD: 0.4 %
LYMPHOCYTES # BLD AUTO: 2.1 10E9/L (ref 0.8–5.3)
LYMPHOCYTES NFR BLD AUTO: 37.7 %
MCH RBC QN AUTO: 17.1 PG (ref 26.5–33)
MCHC RBC AUTO-ENTMCNC: 25.5 G/DL (ref 31.5–36.5)
MCV RBC AUTO: 67 FL (ref 78–100)
MONOCYTES # BLD AUTO: 0.6 10E9/L (ref 0–1.3)
MONOCYTES NFR BLD AUTO: 9.9 %
NEUTROPHILS # BLD AUTO: 2.6 10E9/L (ref 1.6–8.3)
NEUTROPHILS NFR BLD AUTO: 47.5 %
NRBC # BLD AUTO: 0 10*3/UL
NRBC BLD AUTO-RTO: 0 /100
PLATELET # BLD AUTO: 296 10E9/L (ref 150–450)
POTASSIUM SERPL-SCNC: 3.5 MMOL/L (ref 3.4–5.3)
RBC # BLD AUTO: 4.9 10E12/L (ref 3.8–5.2)
SODIUM SERPL-SCNC: 140 MMOL/L (ref 133–144)
TROPONIN I SERPL-MCNC: <0.015 UG/L (ref 0–0.04)
WBC # BLD AUTO: 5.6 10E9/L (ref 4–11)

## 2020-10-14 PROCEDURE — 99284 EMERGENCY DEPT VISIT MOD MDM: CPT | Mod: 25

## 2020-10-14 PROCEDURE — 71045 X-RAY EXAM CHEST 1 VIEW: CPT

## 2020-10-14 PROCEDURE — 250N000009 HC RX 250: Performed by: NURSE PRACTITIONER

## 2020-10-14 PROCEDURE — 80048 BASIC METABOLIC PNL TOTAL CA: CPT | Performed by: NURSE PRACTITIONER

## 2020-10-14 PROCEDURE — 84484 ASSAY OF TROPONIN QUANT: CPT | Performed by: NURSE PRACTITIONER

## 2020-10-14 PROCEDURE — 250N000012 HC RX MED GY IP 250 OP 636 PS 637: Performed by: NURSE PRACTITIONER

## 2020-10-14 PROCEDURE — 85025 COMPLETE CBC W/AUTO DIFF WBC: CPT | Performed by: NURSE PRACTITIONER

## 2020-10-14 PROCEDURE — 94640 AIRWAY INHALATION TREATMENT: CPT

## 2020-10-14 RX ORDER — PREDNISONE 20 MG/1
40 TABLET ORAL DAILY
Qty: 10 TABLET | Refills: 0 | Status: SHIPPED | OUTPATIENT
Start: 2020-10-14 | End: 2020-10-19

## 2020-10-14 RX ORDER — ALBUTEROL SULFATE 90 UG/1
2-4 AEROSOL, METERED RESPIRATORY (INHALATION)
Qty: 1 INHALER | Refills: 1 | Status: SHIPPED | OUTPATIENT
Start: 2020-10-14 | End: 2020-12-14

## 2020-10-14 RX ORDER — PREDNISONE 20 MG/1
60 TABLET ORAL ONCE
Status: COMPLETED | OUTPATIENT
Start: 2020-10-14 | End: 2020-10-14

## 2020-10-14 RX ORDER — IPRATROPIUM BROMIDE AND ALBUTEROL SULFATE 2.5; .5 MG/3ML; MG/3ML
6 SOLUTION RESPIRATORY (INHALATION) ONCE
Status: COMPLETED | OUTPATIENT
Start: 2020-10-14 | End: 2020-10-14

## 2020-10-14 RX ORDER — IPRATROPIUM BROMIDE AND ALBUTEROL SULFATE 2.5; .5 MG/3ML; MG/3ML
3 SOLUTION RESPIRATORY (INHALATION) ONCE
Status: COMPLETED | OUTPATIENT
Start: 2020-10-14 | End: 2020-10-14

## 2020-10-14 RX ADMIN — IPRATROPIUM BROMIDE AND ALBUTEROL SULFATE 6 ML: .5; 3 SOLUTION RESPIRATORY (INHALATION) at 22:15

## 2020-10-14 RX ADMIN — IPRATROPIUM BROMIDE AND ALBUTEROL SULFATE 3 ML: .5; 3 SOLUTION RESPIRATORY (INHALATION) at 23:00

## 2020-10-14 RX ADMIN — PREDNISONE 60 MG: 20 TABLET ORAL at 22:51

## 2020-10-14 NOTE — LETTER
October 15, 2020      To Whom It May Concern:      Carlotta Arce was seen in our Emergency Department today, 10/14/20-10/15/20.  I expect her condition to improve over the next 1-2 days.  She may return to work/school when improved.    Sincerely,    Carissa COLEMAN RN

## 2020-10-14 NOTE — ED AVS SNAPSHOT
[facility]  [address]  [facility] [address]  Phone: [phone]  Fax: [phone]                                    Carlotta Arce   MRN: [id number]    Department: [facility]   Date of Visit: 10/14/2020           After Visit Summary Signature Page    I have received my discharge instructions, and my questions have been answered. I have discussed any challenges I see with this plan with the nurse or doctor.    ..........................................................................................................................................  Patient/Patient Representative Signature      ..........................................................................................................................................  Patient Representative Print Name and Relationship to Patient    ..................................................               ................................................  Date                                   Time    ..........................................................................................................................................  Reviewed by Signature/Title    ...................................................              ..............................................  Date                                               Time          22EPIC Rev 08/18

## 2020-10-15 VITALS
HEART RATE: 86 BPM | OXYGEN SATURATION: 98 % | SYSTOLIC BLOOD PRESSURE: 125 MMHG | RESPIRATION RATE: 16 BRPM | DIASTOLIC BLOOD PRESSURE: 80 MMHG | TEMPERATURE: 98.2 F

## 2020-10-15 ASSESSMENT — ENCOUNTER SYMPTOMS
COUGH: 0
CHILLS: 0
SHORTNESS OF BREATH: 1
DYSURIA: 0
ABDOMINAL PAIN: 0
FEVER: 0
WHEEZING: 1

## 2020-10-15 NOTE — ED PROVIDER NOTES
History     Chief Complaint:  Asthma Exacerbation       The history is provided by the patient.     Carlotta Arce is a 55 year old female with a history of asthma who presents for evaluation of shortness of breath.  She states that she ran out of her home medications and was unable to get them filled due to a lapse in her insurance coverage.  Without her medications at home she was unable to provide adequate care for her asthma at home therefore ended up here.  She did not have associated fever, nasal congestion, appropriate 19 symptoms.  She has no other concerns or complaints.    Allergies:  No Known Drug Allergies    Medications:   Albuterol inhaler  Albuterol neb solution  Symbicort inhaler  Oxycodone  Xarelto     Medical History:   Kyphoscoliosis and scoliosis  Asthma  Tobacco use disorder    Surgical History   C section x3  Cholecystectomy  Tubal ligation x2    Family History:   Mother -  Asthma, lung cancer  Father - asthma     Social History:  The patient was unaccompanied to the ED.  Smoking Status: Current - 0.50 packs/day  Smokeless Tobacco: Never  Alcohol Use: Yes  Drug Use: No    Marital Status:   PCP: Casey Gottlieb        Review of Systems   Constitutional: Negative for chills and fever.   Respiratory: Positive for shortness of breath and wheezing. Negative for cough.    Cardiovascular: Negative for chest pain.   Gastrointestinal: Negative for abdominal pain.   Genitourinary: Negative for dysuria.   All other systems reviewed and are negative.        Physical Exam     Patient Vitals for the past 24 hrs:   BP Temp Temp src Pulse Resp SpO2   10/14/20 2350 -- -- -- -- -- 98 %   10/14/20 2340 -- -- -- -- -- 99 %   10/14/20 2330 -- -- -- -- -- 99 %   10/14/20 2320 -- -- -- -- -- 97 %   10/14/20 2310 -- -- -- -- -- 96 %   10/14/20 2300 -- -- -- -- -- 97 %   10/14/20 2255 -- -- -- -- -- 97 %   10/14/20 2250 -- -- -- -- -- 97 %   10/14/20 2245 -- -- -- -- -- 97 %   10/14/20 2240 -- -- --  -- -- 97 %   10/14/20 2235 -- -- -- -- -- 97 %   10/14/20 2230 -- -- -- -- -- 98 %   10/14/20 2220 -- -- -- -- -- 97 %   10/14/20 2215 -- -- -- -- -- 97 %   10/14/20 2210 -- -- -- -- -- 97 %   10/14/20 2205 -- -- -- -- -- 97 %   10/14/20 2200 -- -- -- -- -- 97 %   10/14/20 2155 -- -- -- -- -- 98 %   10/14/20 2106 125/80 98.2  F (36.8  C) Oral 86 16 98 %          Physical Exam  General: Alert, Mild  discomfort, well kept  Eyes: PERRL, conjunctivae pink no scleral icterus or conjunctival injection  ENT:   Moist mucus membranes, posterior oropharynx clear without erythema or exudates, No lymphadenopathy, Normal voice  Resp: Decreased air movement and wheezing throughout.  This was after her first nebulizer treatment.   Reevaluation: After second nebulizer treatment wheezing had resolved.  Patient felt significantly improved was breathing without difficulty.  CV:  Normal rate and rhythm, no murmurs/rubs/gallops  GI:  Abdomen soft and non-distended.  Normoactive BS.  No tenderness, guarding or rebound, No masses  Skin:  Warm, dry.  No rashes or petechiae  Musculoskeletal: No peripheral edema or calf tenderness, Normal gross ROM   Neuro: Alert and oriented to person/place/time, normal sensation  Psychiatric: Normal affect, cooperative, good eye contact    Emergency Department Course     Imaging:  Radiology results were communicated with the patient who voiced understanding of the findings.    XR Chest Port 1 View:  IMPRESSION: No change. Heart size and pulmonary vessels are normal. Lungs are well-inflated but clear, no pneumonia. Moderate rightward scoliosis of low thoracic spine.  Reading per radiology.    Laboratory:  Laboratory findings were communicated with the patient who voiced understanding of the findings.    CBC: WBC 5.6, HGB 8.4 (L) ,   BMP: Glucose 112 (H), Calcium 8.3 (L), (Creatinine 0.56) o/w WNL   Troponin (Collected 2211): <0.015      Interventions:   2215 Duoneb solution 6 mL  Nebulization  2251 Prednisone 60 mg PO  2300 Duoneb solution 3 mL Nebulization    Emergency Department Course:    Nursing notes and vitals reviewed.    2211 IV was inserted and blood was drawn for laboratory testing, results above.    2226 I performed an exam of the patient as documented above.     2326 The patient was sent for XR while in the emergency department, results above.     2357 Patient rechecked and updated.  She is feeling greatly improved.     2357 Findings and plan explained to the Patient. Patient discharged home with instructions regarding supportive care, medications, and reasons to return. The importance of close follow-up was reviewed. The patient was prescribed as below.     Impression & Plan     Medical Decision Making:  Carlotta Arce is a 55 year old female who presents for evaluation of shortness of breath and wheezing.  Signs and symptoms are consistent with asthma exacerbation.  A broad differential was considered including foreign body, pneumonia, bronchitis, reactive airway disease, pneumothorax, cardiac equivalent, viral induced wheezing, etc.  Patient feels improved after interventions here in the ER.  There are no signs at this point of any serious etiologies including those mentioned above.  No indication for hospitalization at this time including no hypoxia, no marked increase in respiratory rate, minimal to no retractions. Supportive outpatient management is indicated, medications for discharge noted above.  As she was unable to get her medications filled due to insurance issues I was able to get her albuterol inhaler about her prednisone filled here.  She will follow-up with her primary care provider and is in the process of renewing her insurance to make sure she has long-term medication as needed.  Close followup with primary care physician in the next 2-3 days if no improvement or sooner if worsening.  Return  To UR/ER if increased wheezing, progressive shortness of  breath, develops fever greater than 102, or for other concerns.         Diagnosis:     ICD-10-CM    1. Asthma exacerbation  J45.901         Disposition:  Discharged to home.    Discharge Medications:  Discharge Medication List as of 10/15/2020 12:13 AM      START taking these medications    Details   !! albuterol (PROAIR HFA/PROVENTIL HFA/VENTOLIN HFA) 108 (90 Base) MCG/ACT inhaler Inhale 2-4 puffs into the lungs every 2 hours as needed for shortness of breath / dyspnea, Disp-1 Inhaler, R-1, Local PrintWITH SPACER      predniSONE (DELTASONE) 20 MG tablet Take 2 tablets (40 mg) by mouth daily for 5 days, Disp-10 tablet, R-0, Local Print       !! - Potential duplicate medications found. Please discuss with provider.          Scribe Disclosure:  I, Brenda Rivero, am serving as a scribe at 10:22 PM on 10/14/2020 to document services personally performed by Alfredo Griffin APRN based on my observations and the provider's statements to me.      Alfredo Griffin APRN CNP  10/15/20 0049

## 2020-10-15 NOTE — ED TRIAGE NOTES
PT presents to ED with c/o asthma exacerbation. PT states that she used her inhaler today and it did not help her symptoms. Pt ran out of her nebulizer today. Icreased respiratory rate. Audible wheezing. ABC intact. A/O x4/

## 2020-12-14 ENCOUNTER — HOSPITAL ENCOUNTER (EMERGENCY)
Facility: CLINIC | Age: 55
Discharge: HOME | End: 2020-12-14
Attending: EMERGENCY MEDICINE | Admitting: EMERGENCY MEDICINE

## 2020-12-14 ENCOUNTER — APPOINTMENT (OUTPATIENT)
Dept: GENERAL RADIOLOGY | Facility: CLINIC | Age: 55
End: 2020-12-14
Attending: EMERGENCY MEDICINE

## 2020-12-14 VITALS
SYSTOLIC BLOOD PRESSURE: 103 MMHG | DIASTOLIC BLOOD PRESSURE: 84 MMHG | RESPIRATION RATE: 16 BRPM | HEART RATE: 80 BPM | OXYGEN SATURATION: 99 % | TEMPERATURE: 98.1 F

## 2020-12-14 DIAGNOSIS — J45.40 MODERATE PERSISTENT ASTHMA WITHOUT COMPLICATION: ICD-10-CM

## 2020-12-14 DIAGNOSIS — J45.901 EXACERBATION OF ASTHMA, UNSPECIFIED ASTHMA SEVERITY, UNSPECIFIED WHETHER PERSISTENT: ICD-10-CM

## 2020-12-14 DIAGNOSIS — J06.9 VIRAL UPPER RESPIRATORY TRACT INFECTION: ICD-10-CM

## 2020-12-14 PROCEDURE — 99284 EMERGENCY DEPT VISIT MOD MDM: CPT | Mod: 25

## 2020-12-14 PROCEDURE — 71045 X-RAY EXAM CHEST 1 VIEW: CPT

## 2020-12-14 PROCEDURE — U0003 INFECTIOUS AGENT DETECTION BY NUCLEIC ACID (DNA OR RNA); SEVERE ACUTE RESPIRATORY SYNDROME CORONAVIRUS 2 (SARS-COV-2) (CORONAVIRUS DISEASE [COVID-19]), AMPLIFIED PROBE TECHNIQUE, MAKING USE OF HIGH THROUGHPUT TECHNOLOGIES AS DESCRIBED BY CMS-2020-01-R: HCPCS | Performed by: EMERGENCY MEDICINE

## 2020-12-14 PROCEDURE — 250N000013 HC RX MED GY IP 250 OP 250 PS 637: Performed by: EMERGENCY MEDICINE

## 2020-12-14 PROCEDURE — C9803 HOPD COVID-19 SPEC COLLECT: HCPCS

## 2020-12-14 RX ORDER — BUDESONIDE AND FORMOTEROL FUMARATE DIHYDRATE 80; 4.5 UG/1; UG/1
2 AEROSOL RESPIRATORY (INHALATION) 2 TIMES DAILY
Qty: 10.2 G | Refills: 11 | Status: SHIPPED | OUTPATIENT
Start: 2020-12-14 | End: 2021-01-23

## 2020-12-14 RX ORDER — ALBUTEROL SULFATE 90 UG/1
6 AEROSOL, METERED RESPIRATORY (INHALATION) ONCE
Status: COMPLETED | OUTPATIENT
Start: 2020-12-14 | End: 2020-12-14

## 2020-12-14 RX ORDER — IPRATROPIUM BROMIDE AND ALBUTEROL SULFATE 2.5; .5 MG/3ML; MG/3ML
1 SOLUTION RESPIRATORY (INHALATION) EVERY 4 HOURS PRN
Qty: 1 BOX | Refills: 1 | Status: SHIPPED | OUTPATIENT
Start: 2020-12-14

## 2020-12-14 RX ORDER — DEXAMETHASONE 4 MG/1
8 TABLET ORAL
Qty: 15 TABLET | Refills: 0 | Status: SHIPPED | OUTPATIENT
Start: 2020-12-16

## 2020-12-14 RX ORDER — ALBUTEROL SULFATE 90 UG/1
4-6 AEROSOL, METERED RESPIRATORY (INHALATION) EVERY 4 HOURS PRN
Qty: 18 G | Refills: 11 | Status: SHIPPED | OUTPATIENT
Start: 2020-12-14 | End: 2021-01-23

## 2020-12-14 RX ORDER — ALBUTEROL SULFATE 0.83 MG/ML
2.5 SOLUTION RESPIRATORY (INHALATION) EVERY 4 HOURS PRN
Qty: 1 BOX | Refills: 0 | Status: SHIPPED | OUTPATIENT
Start: 2020-12-14 | End: 2021-01-23

## 2020-12-14 RX ADMIN — ALBUTEROL SULFATE 6 PUFF: 90 AEROSOL, METERED RESPIRATORY (INHALATION) at 15:01

## 2020-12-14 RX ADMIN — Medication 10 MG: at 15:00

## 2020-12-14 ASSESSMENT — ENCOUNTER SYMPTOMS
COUGH: 1
SORE THROAT: 0
SHORTNESS OF BREATH: 1
VOMITING: 0
WHEEZING: 1
DIARRHEA: 0
FEVER: 0

## 2020-12-14 NOTE — LETTER
December 14, 2020      To Whom It May Concern:      Carlotta Arce was seen in our Emergency Department today, 12/14/20.  I expect her condition to improve over the next 3 days.  She may return to work/school when improved.    Sincerely,        Iveth Adamson RN

## 2020-12-14 NOTE — ED AVS SNAPSHOT
[facility]  [address]  [facility] [address]  Phone: [phone]  Fax: [phone]                                    Carlotta Arce   MRN: [id number]    Department: [facility] Emergency [facility]   Date of Visit: 12/14/2020           After Visit Summary Signature Page    I have received my discharge instructions, and my questions have been answered. I have discussed any challenges I see with this plan with the nurse or doctor.    ..........................................................................................................................................  Patient/Patient Representative Signature      ..........................................................................................................................................  Patient Representative Print Name and Relationship to Patient    ..................................................               ................................................  Date                                   Time    ..........................................................................................................................................  Reviewed by Signature/Title    ...................................................              ..............................................  Date                                               Time          22EPIC Rev 08/18

## 2020-12-14 NOTE — ED PROVIDER NOTES
History     Chief Complaint:  Shortness of Breath    HPI   Carlotta Arce is a 55 year old female with a history of asthma who presents with shortness of breath. This morning after waking up the patient had onset of shortness of breath, wheezing, and a cough. For her symptoms she tried using her albuterol inhaler and nebulizer. She has felt at baseline for the past few days. She denies associated sore throat, sinus congestion, fever, vomiting, and diarrhea.      Allergies:  No Known Drug Allergies      Medications:    Albuterol inhaler  Albuterol nebulizer  Symbicort  Xarelto    Past Medical History:    Asthma  Kyphoscoliosis and scoliosis    Past Surgical History:     section x3  Cholecystectomy  Tubal ligation x2    Family History:    Asthma  Lung cancer    Social History:  Smoking status: Current, daily  Alcohol use: Yes  Drug use: No  Patient presents alone.  PCP: Casey Gottlieb    Marital Status:        Review of Systems   Constitutional: Negative for fever.   HENT: Negative for congestion and sore throat.    Respiratory: Positive for cough, shortness of breath and wheezing.    Gastrointestinal: Negative for diarrhea and vomiting.   All other systems reviewed and are negative.      Physical Exam     Patient Vitals for the past 24 hrs:   BP Temp Pulse Resp SpO2   20 1610 -- -- -- -- 99 %   20 1600 -- -- 80 -- 98 %   20 1550 103/84 -- -- -- 98 %   20 1450 (!) 146/84 -- 85 -- 98 %   20 1406 (!) 134/90 98.1  F (36.7  C) 82 16 100 %      Physical Exam  Eyes: Normal conjunctiva, No  discharge     Neck: supple     CV: ppi,  Regular, no m/r/g     Resp:       expiratory wheezes diffusely     No accessory muscle use     Slightly prolonged exp phase      Abd: Soft, NT, ND     Ext:  Peripheral edema: No     Skin: warm dry well perfused     Neuro: Alert, no gross motor or sensory deficits,  gait stable    Emergency Department Course     Imaging:  Chest XR Port 1  View:  IMPRESSION: No acute disease.  Reading per radiology.     Laboratory:  Symptomatic COVID-19 Virus by PCR: In process     Interventions:  1500 Decadron 10 mg alcohol-free oral solution PO  1501 Albuterol inhaler, 6 puffs inhalation given     Emergency Department Course:  1500 Nursing notes and vitals reviewed. I performed an exam of the patient as documented above.     IV inserted. Medicine administered as documented above. Blood drawn. This was sent to the lab for further testing, results above.    The patient was sent for a x-ray while in the emergency department, findings above.     1551 I rechecked the patient and discussed the results of her workup thus far.     Findings and plan explained to the Patient. Patient discharged home with instructions regarding supportive care, medications, and reasons to return. The importance of close follow-up was reviewed. The patient was prescribed Decadron and Duoneb.    Impression & Plan    Covid-19  Carlotta Arce was evaluated during a global COVID-19 pandemic, which necessitated consideration that the patient might be at risk for infection with the SARS-CoV-2 virus that causes COVID-19.   Applicable protocols for evaluation were followed during the patient's care.   COVID-19 was considered as part of the patient's evaluation. The plan for testing is:  a test was obtained during this visit.    Medical Decision Makin-year-old female here with asthma exacerbation likely triggered by underlying viral respiratory tract infection, Covid swab pending, chest x-ray with no evidence of lobar pneumonia.  Not hypoxic breathing and wheezing improved after bronchodilators here will do single dose of Decadron another as needed dose in 36 hours if not improving.  She is comfortable agreeable with that plan.  Home meds refilled here.      Diagnosis:    ICD-10-CM    1. Viral upper respiratory tract infection  J06.9 Symptomatic COVID-19 Virus (Coronavirus) by PCR   2.  Exacerbation of asthma, unspecified asthma severity, unspecified whether persistent  J45.901    3. Moderate persistent asthma without complication  J45.40 albuterol (PROAIR HFA) 108 (90 Base) MCG/ACT inhaler     budesonide-formoterol (SYMBICORT) 80-4.5 MCG/ACT Inhaler       Disposition:  discharged to home    Discharge Medications:  New Prescriptions    DEXAMETHASONE (DECADRON) 4 MG TABLET    Take 2 tablets (8 mg) by mouth once as needed (wheezing, asthma flare)    IPRATROPIUM - ALBUTEROL 0.5 MG/2.5 MG/3 ML (DUONEB) 0.5-2.5 (3) MG/3ML NEB SOLUTION    Take 1 vial (3 mLs) by nebulization every 4 hours as needed for shortness of breath / dyspnea       Liza Ferreira  12/14/2020   [facility] EMERGENCY DEPT    Scribe Disclosure:  I, Liza Ferreira, am serving as a scribe at 3:00 PM on 12/14/2020 to document services personally performed by Anatoly Baker MD based on my observations and the provider's statements to me.         Anatoly Baker MD  12/14/20 8070

## 2020-12-14 NOTE — ED TRIAGE NOTES
"Patient presents to the ED stating \"my asthma is acting up.\" Reports feeling SOB since yesterday. States used home inhalers and nebs with no relief.   "

## 2020-12-15 ENCOUNTER — TELEPHONE (OUTPATIENT)
Dept: EMERGENCY MEDICINE | Facility: CLINIC | Age: 55
End: 2020-12-15

## 2020-12-15 LAB
SARS-COV-2 RNA SPEC QL NAA+PROBE: NOT DETECTED
SPECIMEN SOURCE: NORMAL

## 2020-12-15 NOTE — TELEPHONE ENCOUNTER
Coronavirus (COVID-19) Notification     Reason for call  Patient requesting results     Lab Result    Lab test 2019-nCoV rRt-PCR in process        RN Recommendations/Instructions per [facility]  Continue quarantee and following instructions until you receive the results     Please Contact your PCP clinic or return to the Emergency department if your:    Symptoms worsen or other concerning symptom's.     Patient informed that if test for COVID19 is POSITIVE,  you will receive a call typically within 48 hours from the test date (date lab collected).  If NEGATIVE result, you will receive a letter in the mail or [facility].      Lucy Baig

## 2020-12-16 NOTE — TELEPHONE ENCOUNTER

## 2021-01-23 ENCOUNTER — HOSPITAL ENCOUNTER (EMERGENCY)
Facility: CLINIC | Age: 56
Discharge: HOME | End: 2021-01-23
Attending: EMERGENCY MEDICINE | Admitting: EMERGENCY MEDICINE

## 2021-01-23 ENCOUNTER — APPOINTMENT (OUTPATIENT)
Dept: GENERAL RADIOLOGY | Facility: CLINIC | Age: 56
End: 2021-01-23
Attending: EMERGENCY MEDICINE

## 2021-01-23 ENCOUNTER — NURSE TRIAGE (OUTPATIENT)
Dept: NURSING | Facility: CLINIC | Age: 56
End: 2021-01-23

## 2021-01-23 VITALS
RESPIRATION RATE: 17 BRPM | SYSTOLIC BLOOD PRESSURE: 109 MMHG | HEART RATE: 75 BPM | OXYGEN SATURATION: 99 % | TEMPERATURE: 97.9 F | DIASTOLIC BLOOD PRESSURE: 43 MMHG

## 2021-01-23 DIAGNOSIS — J45.901 EXACERBATION OF ASTHMA, UNSPECIFIED ASTHMA SEVERITY, UNSPECIFIED WHETHER PERSISTENT: ICD-10-CM

## 2021-01-23 DIAGNOSIS — J45.40 MODERATE PERSISTENT ASTHMA WITHOUT COMPLICATION: ICD-10-CM

## 2021-01-23 LAB
ANION GAP SERPL CALCULATED.3IONS-SCNC: <1 MMOL/L (ref 3–14)
BASOPHILS # BLD AUTO: 0.1 10E9/L (ref 0–0.2)
BASOPHILS NFR BLD AUTO: 1.8 %
BUN SERPL-MCNC: 15 MG/DL (ref 7–30)
CALCIUM SERPL-MCNC: 8.9 MG/DL (ref 8.5–10.1)
CHLORIDE SERPL-SCNC: 106 MMOL/L (ref 94–109)
CO2 SERPL-SCNC: 31 MMOL/L (ref 20–32)
CREAT SERPL-MCNC: 0.61 MG/DL (ref 0.52–1.04)
DIFFERENTIAL METHOD BLD: ABNORMAL
EOSINOPHIL # BLD AUTO: 0.1 10E9/L (ref 0–0.7)
EOSINOPHIL NFR BLD AUTO: 2.1 %
ERYTHROCYTE [DISTWIDTH] IN BLOOD BY AUTOMATED COUNT: 21.2 % (ref 10–15)
GFR SERPL CREATININE-BSD FRML MDRD: >90 ML/MIN/{1.73_M2}
GLUCOSE SERPL-MCNC: 98 MG/DL (ref 70–99)
HCT VFR BLD AUTO: 31.4 % (ref 35–47)
HGB BLD-MCNC: 8.3 G/DL (ref 11.7–15.7)
IMM GRANULOCYTES # BLD: 0 10E9/L (ref 0–0.4)
IMM GRANULOCYTES NFR BLD: 0.1 %
LYMPHOCYTES # BLD AUTO: 2.1 10E9/L (ref 0.8–5.3)
LYMPHOCYTES NFR BLD AUTO: 30.9 %
MCH RBC QN AUTO: 17.1 PG (ref 26.5–33)
MCHC RBC AUTO-ENTMCNC: 26.4 G/DL (ref 31.5–36.5)
MCV RBC AUTO: 65 FL (ref 78–100)
MONOCYTES # BLD AUTO: 0.6 10E9/L (ref 0–1.3)
MONOCYTES NFR BLD AUTO: 9.3 %
NEUTROPHILS # BLD AUTO: 3.7 10E9/L (ref 1.6–8.3)
NEUTROPHILS NFR BLD AUTO: 55.8 %
NRBC # BLD AUTO: 0 10*3/UL
NRBC BLD AUTO-RTO: 0 /100
PLATELET # BLD AUTO: 372 10E9/L (ref 150–450)
POTASSIUM SERPL-SCNC: 4.1 MMOL/L (ref 3.4–5.3)
RBC # BLD AUTO: 4.85 10E12/L (ref 3.8–5.2)
SODIUM SERPL-SCNC: 137 MMOL/L (ref 133–144)
TROPONIN I SERPL-MCNC: <0.015 UG/L (ref 0–0.04)
WBC # BLD AUTO: 6.7 10E9/L (ref 4–11)

## 2021-01-23 PROCEDURE — 93005 ELECTROCARDIOGRAM TRACING: CPT

## 2021-01-23 PROCEDURE — 94640 AIRWAY INHALATION TREATMENT: CPT

## 2021-01-23 PROCEDURE — 80048 BASIC METABOLIC PNL TOTAL CA: CPT | Performed by: EMERGENCY MEDICINE

## 2021-01-23 PROCEDURE — 99285 EMERGENCY DEPT VISIT HI MDM: CPT | Mod: 25

## 2021-01-23 PROCEDURE — 71045 X-RAY EXAM CHEST 1 VIEW: CPT

## 2021-01-23 PROCEDURE — 84484 ASSAY OF TROPONIN QUANT: CPT | Performed by: EMERGENCY MEDICINE

## 2021-01-23 PROCEDURE — 85025 COMPLETE CBC W/AUTO DIFF WBC: CPT | Performed by: EMERGENCY MEDICINE

## 2021-01-23 PROCEDURE — 250N000012 HC RX MED GY IP 250 OP 636 PS 637: Performed by: EMERGENCY MEDICINE

## 2021-01-23 PROCEDURE — 94640 AIRWAY INHALATION TREATMENT: CPT | Mod: 76

## 2021-01-23 PROCEDURE — 250N000009 HC RX 250: Performed by: EMERGENCY MEDICINE

## 2021-01-23 RX ORDER — PREDNISONE 20 MG/1
TABLET ORAL
Qty: 8 TABLET | Refills: 0 | Status: SHIPPED | OUTPATIENT
Start: 2021-01-23 | End: 2022-04-06

## 2021-01-23 RX ORDER — ALBUTEROL SULFATE 0.83 MG/ML
2.5 SOLUTION RESPIRATORY (INHALATION) EVERY 4 HOURS PRN
Qty: 200 ML | Refills: 1 | Status: SHIPPED | OUTPATIENT
Start: 2021-01-23

## 2021-01-23 RX ORDER — ALBUTEROL SULFATE 90 UG/1
2 AEROSOL, METERED RESPIRATORY (INHALATION) ONCE
Status: DISCONTINUED | OUTPATIENT
Start: 2021-01-23 | End: 2021-01-23 | Stop reason: HOSPADM

## 2021-01-23 RX ORDER — BUDESONIDE AND FORMOTEROL FUMARATE DIHYDRATE 80; 4.5 UG/1; UG/1
2 AEROSOL RESPIRATORY (INHALATION) 2 TIMES DAILY
Qty: 10.2 G | Refills: 11 | Status: SHIPPED | OUTPATIENT
Start: 2021-01-23

## 2021-01-23 RX ORDER — ALBUTEROL SULFATE 90 UG/1
4-6 AEROSOL, METERED RESPIRATORY (INHALATION) EVERY 4 HOURS PRN
Qty: 18 G | Refills: 11 | Status: SHIPPED | OUTPATIENT
Start: 2021-01-23

## 2021-01-23 RX ORDER — PREDNISONE 20 MG/1
60 TABLET ORAL ONCE
Status: COMPLETED | OUTPATIENT
Start: 2021-01-23 | End: 2021-01-23

## 2021-01-23 RX ORDER — IPRATROPIUM BROMIDE AND ALBUTEROL SULFATE 2.5; .5 MG/3ML; MG/3ML
3 SOLUTION RESPIRATORY (INHALATION)
Status: COMPLETED | OUTPATIENT
Start: 2021-01-23 | End: 2021-01-23

## 2021-01-23 RX ADMIN — IPRATROPIUM BROMIDE AND ALBUTEROL SULFATE 3 ML: .5; 3 SOLUTION RESPIRATORY (INHALATION) at 18:54

## 2021-01-23 RX ADMIN — IPRATROPIUM BROMIDE AND ALBUTEROL SULFATE 3 ML: .5; 3 SOLUTION RESPIRATORY (INHALATION) at 19:13

## 2021-01-23 RX ADMIN — PREDNISONE 60 MG: 20 TABLET ORAL at 18:34

## 2021-01-23 RX ADMIN — IPRATROPIUM BROMIDE AND ALBUTEROL SULFATE 3 ML: .5; 3 SOLUTION RESPIRATORY (INHALATION) at 18:35

## 2021-01-23 ASSESSMENT — ENCOUNTER SYMPTOMS
FEVER: 0
CHILLS: 0
COUGH: 1
SHORTNESS OF BREATH: 1
CHEST TIGHTNESS: 1
SORE THROAT: 0
HEADACHES: 0

## 2021-01-23 NOTE — TELEPHONE ENCOUNTER
"Carlotta is having shortness of breath and chest pain since 2:00 am this morning.  Carlotta is using nebulizer machine and it is not helping.  Chest pain comes and goes.  Chest pain is currently a \"6\".      COVID 19 Nurse Triage Plan/Patient Instructions    Please be aware that novel coronavirus (COVID-19) may be circulating in the community. If you develop symptoms such as fever, cough, or SOB or if you have concerns about the presence of another infection including coronavirus (COVID-19), please contact your health care provider or visit [email or web address].     Disposition/Instructions    ED Visit recommended. Follow protocol based instructions.     Bring Your Own Device:  Please also bring your smart device(s) (smart phones, tablets, laptops) and their charging cables for your personal use and to communicate with your care team during your visit.    Thank you for taking steps to prevent the spread of this virus.  o Limit your contact with others.  o Wear a simple mask to cover your cough.  o Wash your hands well and often.    Resources    [facility]: About COVID-19: [email or web address]    CDC: What to Do If You're Sick: www.cdc.gov/coronavirus/2019-ncov/about/steps-when-sick.html    CDC: Ending Home Isolation: www.cdc.gov/coronavirus/2019-ncov/hcp/disposition-in-home-patients.html     CDC: Caring for Someone: www.cdc.gov/coronavirus/2019-ncov/if-you-are-sick/care-for-someone.html     [facility]: Interim Guidance for Hospital Discharge to Home: www.health.[facility].[place]/diseases/coronavirus/hcp/hospdischarge.pdf    [facility] clinical trials (COVID-19 research studies): [email or web address]-trials     Below are the COVID-19 hotlines at the [place] Department of [facility] ([facility]). Interpreters are available.   o For health questions: Call 179-312-0888 or 1-651.555.1704 (7 a.m. to 7 p.m.)  o For questions about schools and childcare: Call 573-001-7766 or 1-136.695.6039 (7 a.m. to 7 p.m.) "                       Reason for Disposition    SEVERE chest pain    Additional Information    Negative: Severe difficulty breathing (e.g., struggling for each breath, speaks in single words)    Negative: Difficult to awaken or acting confused (e.g., disoriented, slurred speech)    Negative: Shock suspected (e.g., cold/pale/clammy skin, too weak to stand, low BP, rapid pulse)    Negative: [1] Chest pain lasts > 5 minutes AND [2] history of heart disease  (i.e., heart attack, bypass surgery, angina, angioplasty, CHF; not just a heart murmur)    Negative: [1] Chest pain lasts > 5 minutes AND [2] described as crushing, pressure-like, or heavy    Negative: [1] Chest pain lasts > 5 minutes AND [2] age > 50    Negative: [1] Chest pain lasts > 5 minutes AND [2] age > 30 AND [3] at least one cardiac risk factor (i.e., hypertension, diabetes, obesity, smoker or strong family history of heart disease)    Negative: [1] Chest pain lasts > 5 minutes AND [2] not relieved with nitroglycerin    Negative: Passed out (i.e., lost consciousness, collapsed and was not responding)    Negative: Heart beating < 50 beats per minute OR > 140 beats per minute    Negative: Visible sweat on face or sweat dripping down face    Negative: Sounds like a life-threatening emergency to the triager    Protocols used: CHEST PAIN-A-

## 2021-01-23 NOTE — LETTER
January 23, 2021      To Whom It May Concern:      Carlotta Arce was seen in our Emergency Department today, 01/23/21.  I expect her condition to improve over the next 2-3 days.  She may return to work when improved.    Sincerely,        Sole Dangelo MD/av

## 2021-01-24 NOTE — ED TRIAGE NOTES
"Presents with sudden onset of SOB and anterior chest pain at 0200 this AM. Hx of asthma, pt has taken inhalers and last nebulizer at home without relief. Reports this \"feels like an asthma attack.\" VSS on RA.   "

## 2021-01-24 NOTE — ED PROVIDER NOTES
History     Chief Complaint:  Chest Pain and Shortness of Breath       The history is provided by the patient.     Carlotta Arce is a 55 year old female with a history of asthma who presents for evaluation of chest tightness and shortness of breath.  She awoke from sleep due to the symptoms at 2 AM.  Yesterday she felt normal.  She feels as though this is consistent with an asthma exacerbation but more severe symptoms than typical.  She last had an asthma exacerbation about a month and a half ago.  She notes she ran out of her medications today due to insurance reasons.  Her last dose of albuterol was at 2 PM.  She denies fever or chills.  She denies leg swelling.  She denies sore throat, congestion or headache.  She notes a cough when she uses albuterol but this is dry and nonproductive.  She notes at work she was exposed to cold air multiple times and is wondering if this is the trigger as she is sensitive to that stimulus.  She notes she was recently tested for Covid and it was negative and denies any recent known exposures.      Review of Systems   Constitutional: Negative for chills and fever.   HENT: Negative for congestion and sore throat.    Respiratory: Positive for cough, chest tightness and shortness of breath.    Cardiovascular: Negative for leg swelling.   Neurological: Negative for headaches.   All other systems reviewed and are negative.      Allergies:  No Known Drug Allergies      Medications:   Albuterol inhaler  Albuterol neb solution   Symbicort inhaler  Dexamethasone  Duoneb solution     Medical History:   Kyphoscoliosis   Asthma   Tobacco use disorder     Surgical History:  C section x3  Cholecystectomy   Tubal ligation x2     Family History:   Mother -  Asthma, lung cancer  Father -  Asthma     Social History:  The patient was unaccompanied to the ED.  Smoking Status: Current smoker  Alcohol Use: Yes  Drug Use: No  Marital Status:       Physical Exam     Patient Vitals for the  past 24 hrs:   BP Temp Temp src Pulse Resp SpO2   01/23/21 1809 136/86 97.9  F (36.6  C) Oral 88 20 100 %          Physical Exam  General: Adult female sitting upright  Eyes: PERRL, Conjunctive within normal limits  ENT: Moist mucous membranes, oropharynx clear.   CV: Normal S1S2, no murmur, rub or gallop. Regular rate and rhythm  Resp: Diffuse wheezing, prolonged expiratory phase.  Mildly increased work of breathing.  Speaks in full sentences.  Occasional dry cough.  GI: Abdomen is soft, nontender and nondistended. No palpable masses. No rebound or guarding.  MSK: No edema. Nontender. Normal active range of motion.  Skin: Warm and dry. No rashes or lesions or ecchymoses on visible skin.  Neuro: Alert and oriented. Responds appropriately to all questions and commands. No focal findings appreciated. Normal muscle tone.  Psych: Normal mood and affect. Pleasant.    Emergency Department Course     ECG:  ECG taken at 1821, ECG read at 1826  Normal sinus rhythm  Normal ECG  Rate 82 bpm. ND interval 138 ms. QRS duration 84 ms. QT/QTc 354/413 ms. P-R-T axes 62 57 56.      Imaging:    XR Chest Port 1 View:  IMPRESSION: Scoliosis. Normal heart size. Lungs clear.  Reading per radiology.     Laboratory:    CBC: WBC 6.7, HGB 8.3 (L),   BMP: Anion Gap <1 (L), (Creatinine 0.61) o/w WNL   Troponin (Collected 1823): <0.015      Emergency Department Course:    Reviewed:  I reviewed the patient's nursing notes, vitals, past medical records.     Assessments:  1815 I performed an exam of the patient, as documented above.   2040 Patient rechecked and updated on laboratory and imaging results. Her breathing is improved after the below interventions.     Interventions:  1834 Prednisone 60 mg PO  1835 Duoneb solution 3 mL Nebulization   1854 Duoneb solution 3 mL Nebulization   1913 Duoneb solution 3 mL Nebulization     Disposition:  The patient was discharged to home.     Impression & Plan     Medical Decision Making:  Carlotta LIMA  Iván Arce is a 55 year old female with a history of asthma who presents for evaluation of shortness of breath and wheezing. Details of the patient's history can be noted in the HPI. Wheezing present on exam, signs and symptoms are consistent with asthma exacerbation.  A broad differential was considered including foreign body, asthma, PE, pneumonia, bronchitis, reactive airway disease, pneumothorax, cardiac equivalent, viral induced wheezing, allergic phenomena, pertussis, etc.  She feels improved after interventions here in ED which included nebulizer treatments and steroids. There are no signs at this point of any serious etiologies including those mentioned above. No fever or productive cough, imaging studies to evaluate for pneumonia or other pathology held off for now. No indication for hospitalization at this time as the patient has no signs of hypoxia, no marked increase in respiratory rate, no significant retractions. Patient was discharged with oral steroids and her typical daily breathing treatments and rescue medications as she was out.  She is recommended to follow-up with her primary care provider in the next 2-3 days. She is recommended to return wheezing, progressive shortness of breath, fever > 102, new concerns.  All questions were answered prior to the patient's discharge. They are in agreement with the plan stated above.       Diagnosis:     ICD-10-CM    1. Exacerbation of asthma, unspecified asthma severity, unspecified whether persistent  J45.901 CBC with platelets differential     Basic metabolic panel     Troponin I   2. Moderate persistent asthma without complication  J45.40 albuterol (PROAIR HFA) 108 (90 Base) MCG/ACT inhaler     budesonide-formoterol (SYMBICORT) 80-4.5 MCG/ACT Inhaler        Discharge Medications:  New Prescriptions    PREDNISONE (DELTASONE) 20 MG TABLET    Take 40mg (2 tablets) by mouth daily for 4 days starting 1/24/21      Scribe Disclosure:  Brenda RODGESR am  serving as a scribe at 6:13 PM on 1/23/2021 to document services personally performed by Sole Dangelo MD based on my observations and the provider's statements to me.      Sole Dangelo MD  01/23/21 9312

## 2021-01-25 LAB — INTERPRETATION ECG - MUSE: NORMAL

## 2022-02-26 ENCOUNTER — APPOINTMENT (OUTPATIENT)
Dept: GENERAL RADIOLOGY | Facility: CLINIC | Age: 57
End: 2022-02-26
Attending: EMERGENCY MEDICINE

## 2022-02-26 ENCOUNTER — HOSPITAL ENCOUNTER (EMERGENCY)
Facility: CLINIC | Age: 57
Discharge: HOME | End: 2022-02-27
Attending: EMERGENCY MEDICINE | Admitting: EMERGENCY MEDICINE

## 2022-02-26 DIAGNOSIS — M75.21 BICEPS TENDONITIS, RIGHT: ICD-10-CM

## 2022-02-26 LAB
ANION GAP SERPL CALCULATED.3IONS-SCNC: 6 MMOL/L (ref 3–14)
BASOPHILS # BLD AUTO: 0.1 10E3/UL (ref 0–0.2)
BASOPHILS NFR BLD AUTO: 1 %
BUN SERPL-MCNC: 11 MG/DL (ref 7–30)
CALCIUM SERPL-MCNC: 10.5 MG/DL (ref 8.5–10.1)
CHLORIDE BLD-SCNC: 101 MMOL/L (ref 94–109)
CO2 SERPL-SCNC: 31 MMOL/L (ref 20–32)
CREAT SERPL-MCNC: 0.57 MG/DL (ref 0.52–1.04)
CRP SERPL-MCNC: 31.8 MG/L (ref 0–8)
EOSINOPHIL # BLD AUTO: 0.1 10E3/UL (ref 0–0.7)
EOSINOPHIL NFR BLD AUTO: 1 %
ERYTHROCYTE [DISTWIDTH] IN BLOOD BY AUTOMATED COUNT: 21.2 % (ref 10–15)
ERYTHROCYTE [SEDIMENTATION RATE] IN BLOOD BY WESTERGREN METHOD: 21 MM/HR (ref 0–30)
GFR SERPL CREATININE-BSD FRML MDRD: >90 ML/MIN/1.73M2
GLUCOSE BLD-MCNC: 126 MG/DL (ref 70–99)
HCT VFR BLD AUTO: 33 % (ref 35–47)
HGB BLD-MCNC: 9.2 G/DL (ref 11.7–15.7)
IMM GRANULOCYTES # BLD: 0.1 10E3/UL
IMM GRANULOCYTES NFR BLD: 1 %
LYMPHOCYTES # BLD AUTO: 1.9 10E3/UL (ref 0.8–5.3)
LYMPHOCYTES NFR BLD AUTO: 18 %
MCH RBC QN AUTO: 18.5 PG (ref 26.5–33)
MCHC RBC AUTO-ENTMCNC: 27.9 G/DL (ref 31.5–36.5)
MCV RBC AUTO: 66 FL (ref 78–100)
MONOCYTES # BLD AUTO: 1 10E3/UL (ref 0–1.3)
MONOCYTES NFR BLD AUTO: 10 %
NEUTROPHILS # BLD AUTO: 7 10E3/UL (ref 1.6–8.3)
NEUTROPHILS NFR BLD AUTO: 69 %
NRBC # BLD AUTO: 0 10E3/UL
NRBC BLD AUTO-RTO: 0 /100
PLATELET # BLD AUTO: 565 10E3/UL (ref 150–450)
POTASSIUM BLD-SCNC: 3 MMOL/L (ref 3.4–5.3)
RBC # BLD AUTO: 4.97 10E6/UL (ref 3.8–5.2)
SODIUM SERPL-SCNC: 138 MMOL/L (ref 133–144)
WBC # BLD AUTO: 10.1 10E3/UL (ref 4–11)

## 2022-02-26 PROCEDURE — 250N000011 HC RX IP 250 OP 636: Performed by: EMERGENCY MEDICINE

## 2022-02-26 PROCEDURE — 85025 COMPLETE CBC W/AUTO DIFF WBC: CPT | Performed by: EMERGENCY MEDICINE

## 2022-02-26 PROCEDURE — 73030 X-RAY EXAM OF SHOULDER: CPT | Mod: RT

## 2022-02-26 PROCEDURE — 99285 EMERGENCY DEPT VISIT HI MDM: CPT | Mod: 25

## 2022-02-26 PROCEDURE — 86140 C-REACTIVE PROTEIN: CPT | Performed by: EMERGENCY MEDICINE

## 2022-02-26 PROCEDURE — 85652 RBC SED RATE AUTOMATED: CPT | Performed by: EMERGENCY MEDICINE

## 2022-02-26 PROCEDURE — 36415 COLL VENOUS BLD VENIPUNCTURE: CPT | Performed by: EMERGENCY MEDICINE

## 2022-02-26 PROCEDURE — 96374 THER/PROPH/DIAG INJ IV PUSH: CPT

## 2022-02-26 PROCEDURE — 80048 BASIC METABOLIC PNL TOTAL CA: CPT | Performed by: EMERGENCY MEDICINE

## 2022-02-26 RX ORDER — HYDROMORPHONE HYDROCHLORIDE 1 MG/ML
0.5 INJECTION, SOLUTION INTRAMUSCULAR; INTRAVENOUS; SUBCUTANEOUS
Status: DISCONTINUED | OUTPATIENT
Start: 2022-02-26 | End: 2022-02-27 | Stop reason: HOSPADM

## 2022-02-26 RX ADMIN — HYDROMORPHONE HYDROCHLORIDE 0.5 MG: 1 INJECTION, SOLUTION INTRAMUSCULAR; INTRAVENOUS; SUBCUTANEOUS at 23:18

## 2022-02-26 NOTE — Clinical Note
Carlotta Arce was seen and treated in our emergency department on 2/26/2022.  She may return to work on 02/28/2022.  Patient may return to work but on light duty.  She may not do any heavy lifting or repetitive work with her right upper extremity.  Okay to use left upper extremity for light work.  She will remain on light duty until pain in her right shoulder significantly improves.     If you have any questions or concerns, please don't hesitate to call.      Tom Dong MD

## 2022-02-27 VITALS
RESPIRATION RATE: 18 BRPM | DIASTOLIC BLOOD PRESSURE: 84 MMHG | BODY MASS INDEX: 27.01 KG/M2 | SYSTOLIC BLOOD PRESSURE: 171 MMHG | WEIGHT: 134 LBS | HEIGHT: 59 IN | OXYGEN SATURATION: 100 % | TEMPERATURE: 97.3 F | HEART RATE: 101 BPM

## 2022-02-27 RX ORDER — OXYCODONE HYDROCHLORIDE 5 MG/1
5 TABLET ORAL EVERY 6 HOURS PRN
Qty: 12 TABLET | Refills: 0 | Status: SHIPPED | OUTPATIENT
Start: 2022-02-27 | End: 2022-03-02

## 2022-02-27 ASSESSMENT — ENCOUNTER SYMPTOMS: WEAKNESS: 0

## 2022-02-27 NOTE — ED PROVIDER NOTES
History     Chief Complaint:  Arm Pain      HPI   Carlotta Arce is a 56 year old female who presents with right arm pain that began yesterday.  Patient reports she was lying in bed when she began having worsening right shoulder pain.  Pain has steadily progressed and she now has significant pain with any range of motion.  She has been using ibuprofen without significant relief.  Pain begins along the right shoulder and radiates into her right upper arm.  Denies any neck pain.  She denies any fevers, chills, nausea or vomiting.  Denies any swelling in her distal right upper extremity.  Denies any trauma or clear inciting incident.  Patient denies any chest pain, shortness of breath.  Denies any pain in right elbow or right wrist.  Denies any weakness, numbness or tingling distal to her pain.    Review of Systems   Musculoskeletal:        Right shoulder pain   Neurological: Negative for weakness.   All other systems reviewed and are negative.    Allergies:  No Known Allergies    Medications:    oxyCODONE (ROXICODONE) 5 MG tablet  albuterol (PROAIR HFA) 108 (90 Base) MCG/ACT inhaler  albuterol (PROVENTIL) (2.5 MG/3ML) 0.083% neb solution  budesonide-formoterol (SYMBICORT) 80-4.5 MCG/ACT Inhaler  dexamethasone (DECADRON) 4 MG tablet  ipratropium - albuterol 0.5 mg/2.5 mg/3 mL (DUONEB) 0.5-2.5 (3) MG/3ML neb solution  predniSONE (DELTASONE) 20 MG tablet      Past Medical History:    Past Medical History:   Diagnosis Date     Kyphoscoliosis and scoliosis      Moderate persistent asthma      Patient Active Problem List    Diagnosis Date Noted     Health Care Home 03/13/2012     Priority: Medium     X  EMERGENCY CARE PLAN  Presenting Problem Signs and Symptoms Treatment Plan    Questions or concerns during clinic hours    I will call the clinic directly     Questions or concerns outside clinic hours    I will call the 24 hour nurse line at [phone]    Patient needs to schedule an appointment    I will call  "the 24 hour scheduling team at [phone] or clinic directly    Same day treatment     I will call the clinic first, nurse line if after hours, urgent care and express care if needed                            DX V65.8 REPLACED WITH 05201 [facility]ALTH CARE HOME (2013)       CARDIOVASCULAR SCREENING; LDL GOAL LESS THAN 160 10/31/2010     Priority: Medium     FAMILY HX OVARIAN MALIGNANCY-DTR 2007     Priority: Medium     Allergic state      Priority: Medium     Problem list name updated by automated process. Provider to review       Moderate persistent asthma      Priority: Medium     Tobacco use disorder      Priority: Medium        Past Surgical History:    Past Surgical History:   Procedure Laterality Date      SECTION      x3     CHOLECYSTECTOMY, LAPOROSCOPIC       TUBAL LIGATION      Became pregnant after this first tubal ligation     TUBAL LIGATION      Repeat       Family History:    Family History   Problem Relation Age of Onset     Asthma Mother      Cancer Mother         lung ca     Asthma Father      Cancer Child         Ovarian     Diabetes No family hx of      C.A.D. No family hx of      Hypertension No family hx of      Breast Cancer No family hx of      Cancer - colorectal No family hx of        Social History:  Presents emergency department with her     Physical Exam     Patient Vitals for the past 24 hrs:   BP Temp Temp src Pulse Resp SpO2 Height Weight   22 0021 (!) 171/84 -- -- -- 18 100 % -- --   22 2330 -- -- -- -- -- 97 % -- --   22 2320 -- -- -- -- -- 99 % -- --   22 2310 -- -- -- -- -- 98 % -- --   22 2300 (!) 144/87 -- -- 101 -- 100 % -- --   22 2244 (!) 160/85 97.3  F (36.3  C) Oral 97 18 100 % 1.499 m (4' 11\") 60.8 kg (134 lb)     Physical Exam  General: Patient is alert, awake and interactive when I enter the room  Head: The scalp, face, and head appear normal  Eyes: Conjunctivae are normal  ENT: The nose is normal, " Pinnae are normal, External acoustic canals are normal  Neck: Trachea midline  CV: Pulses are normal.   Resp: No respiratory distress   Musc: Tenderness to palpation of the proximal humerus and right shoulder joint.  No significant associated erythema or edema.  Significant pain with passive and active range of motion of the right shoulder.  No pain with range of motion or tenderness to the right elbow or right wrist  Skin: No rash or lesions noted  Neuro: Speech is normal and fluent. Face is symmetric.   Psych: Normal affect.  Appropriate interactions.    Emergency Department Course     Imaging:  XR Shoulder Right G/E 3 Views   Final Result   IMPRESSION: No fracture or dislocation. There is soft tissue mineralization lateral to the humeral head likely tendinous.         Laboratory:  Labs Ordered and Resulted from Time of ED Arrival to Time of ED Departure   BASIC METABOLIC PANEL - Abnormal       Result Value    Sodium 138      Potassium 3.0 (*)     Chloride 101      Carbon Dioxide (CO2) 31      Anion Gap 6      Urea Nitrogen 11      Creatinine 0.57      Calcium 10.5 (*)     Glucose 126 (*)     GFR Estimate >90     CRP INFLAMMATION - Abnormal    CRP Inflammation 31.8 (*)    CBC WITH PLATELETS AND DIFFERENTIAL - Abnormal    WBC Count 10.1      RBC Count 4.97      Hemoglobin 9.2 (*)     Hematocrit 33.0 (*)     MCV 66 (*)     MCH 18.5 (*)     MCHC 27.9 (*)     RDW 21.2 (*)     Platelet Count 565 (*)     % Neutrophils 69      % Lymphocytes 18      % Monocytes 10      % Eosinophils 1      % Basophils 1      % Immature Granulocytes 1      NRBCs per 100 WBC 0      Absolute Neutrophils 7.0      Absolute Lymphocytes 1.9      Absolute Monocytes 1.0      Absolute Eosinophils 0.1      Absolute Basophils 0.1      Absolute Immature Granulocytes 0.1      Absolute NRBCs 0.0     ERYTHROCYTE SEDIMENTATION RATE AUTO - Normal    Erythrocyte Sedimentation Rate 21         Emergency Department Course:    Reviewed:  I reviewed nursing  notes, vitals and past history    Interventions:  Medications   HYDROmorphone (PF) (DILAUDID) injection 0.5 mg (0.5 mg Intravenous Given 2/26/22 4870)       Disposition:  The patient was discharged to home.    Impression & Plan      Medical Decision Making:  Patient is a 56-year-old female who presents emergency department with atraumatic right upper arm pain. Patient's physical and work-up are most consistent with a rotator cuff tendinitis. I do not believe that this represents a septic joint based on my work-up. We will treat symptomatically and have her follow-up with orthopedic surgery.    Diagnosis:    ICD-10-CM    1. Biceps tendonitis, right  M75.21        Discharge Medications:  Discharge Medication List as of 2/27/2022 12:31 AM      START taking these medications    Details   oxyCODONE (ROXICODONE) 5 MG tablet Take 1 tablet (5 mg) by mouth every 6 hours as needed for pain, Disp-12 tablet, R-0, Local Print             MD Letitia Hankins, Tom Parrish MD  02/27/22 0155

## 2022-02-27 NOTE — ED TRIAGE NOTES
Here for right arm pain started last night. Denies any fall or injuries. Took motrin at 6:30pm and not helping. Hurts more with movement. ABCs intact.

## 2022-04-06 ENCOUNTER — APPOINTMENT (OUTPATIENT)
Dept: GENERAL RADIOLOGY | Facility: CLINIC | Age: 57
End: 2022-04-06

## 2022-04-06 ENCOUNTER — HOSPITAL ENCOUNTER (EMERGENCY)
Facility: CLINIC | Age: 57
Discharge: HOME | End: 2022-04-06
Attending: EMERGENCY MEDICINE | Admitting: EMERGENCY MEDICINE

## 2022-04-06 VITALS
SYSTOLIC BLOOD PRESSURE: 134 MMHG | HEART RATE: 65 BPM | DIASTOLIC BLOOD PRESSURE: 76 MMHG | RESPIRATION RATE: 18 BRPM | TEMPERATURE: 98 F | OXYGEN SATURATION: 92 %

## 2022-04-06 DIAGNOSIS — R06.03 RESPIRATORY DISTRESS: ICD-10-CM

## 2022-04-06 DIAGNOSIS — J45.909 ASTHMA: ICD-10-CM

## 2022-04-06 DIAGNOSIS — J45.41 MODERATE PERSISTENT ASTHMA WITH EXACERBATION: ICD-10-CM

## 2022-04-06 LAB
ANION GAP SERPL CALCULATED.3IONS-SCNC: 4 MMOL/L (ref 3–14)
ATRIAL RATE - MUSE: 66 BPM
BASE EXCESS BLDV CALC-SCNC: 1.3 MMOL/L (ref -7.7–1.9)
BUN SERPL-MCNC: 19 MG/DL (ref 7–30)
CALCIUM SERPL-MCNC: 9.2 MG/DL (ref 8.5–10.1)
CHLORIDE BLD-SCNC: 105 MMOL/L (ref 94–109)
CO2 SERPL-SCNC: 27 MMOL/L (ref 20–32)
CREAT SERPL-MCNC: 0.64 MG/DL (ref 0.52–1.04)
DIASTOLIC BLOOD PRESSURE - MUSE: NORMAL MMHG
ERYTHROCYTE [DISTWIDTH] IN BLOOD BY AUTOMATED COUNT: 21.1 % (ref 10–15)
GFR SERPL CREATININE-BSD FRML MDRD: >90 ML/MIN/1.73M2
GLUCOSE BLD-MCNC: 107 MG/DL (ref 70–99)
HCO3 BLDV-SCNC: 27 MMOL/L (ref 21–28)
HCT VFR BLD AUTO: 35.5 % (ref 35–47)
HGB BLD-MCNC: 9.7 G/DL (ref 11.7–15.7)
INTERPRETATION ECG - MUSE: NORMAL
MCH RBC QN AUTO: 18.6 PG (ref 26.5–33)
MCHC RBC AUTO-ENTMCNC: 27.3 G/DL (ref 31.5–36.5)
MCV RBC AUTO: 68 FL (ref 78–100)
O2/TOTAL GAS SETTING VFR VENT: 0 %
OXYHGB MFR BLDV: 37 % (ref 70–75)
P AXIS - MUSE: 56 DEGREES
PCO2 BLDV: 44 MM HG (ref 40–50)
PH BLDV: 7.39 [PH] (ref 7.32–7.43)
PLATELET # BLD AUTO: 491 10E3/UL (ref 150–450)
PO2 BLDV: 24 MM HG (ref 25–47)
POTASSIUM BLD-SCNC: 4.1 MMOL/L (ref 3.4–5.3)
PR INTERVAL - MUSE: 150 MS
QRS DURATION - MUSE: 86 MS
QT - MUSE: 398 MS
QTC - MUSE: 417 MS
R AXIS - MUSE: 47 DEGREES
RBC # BLD AUTO: 5.21 10E6/UL (ref 3.8–5.2)
SODIUM SERPL-SCNC: 136 MMOL/L (ref 133–144)
SYSTOLIC BLOOD PRESSURE - MUSE: NORMAL MMHG
T AXIS - MUSE: 63 DEGREES
VENTRICULAR RATE- MUSE: 66 BPM
WBC # BLD AUTO: 5 10E3/UL (ref 4–11)

## 2022-04-06 PROCEDURE — 96374 THER/PROPH/DIAG INJ IV PUSH: CPT

## 2022-04-06 PROCEDURE — 82805 BLOOD GASES W/O2 SATURATION: CPT | Performed by: STUDENT IN AN ORGANIZED HEALTH CARE EDUCATION/TRAINING PROGRAM

## 2022-04-06 PROCEDURE — 250N000009 HC RX 250: Performed by: STUDENT IN AN ORGANIZED HEALTH CARE EDUCATION/TRAINING PROGRAM

## 2022-04-06 PROCEDURE — 250N000011 HC RX IP 250 OP 636: Performed by: STUDENT IN AN ORGANIZED HEALTH CARE EDUCATION/TRAINING PROGRAM

## 2022-04-06 PROCEDURE — 94640 AIRWAY INHALATION TREATMENT: CPT

## 2022-04-06 PROCEDURE — 93005 ELECTROCARDIOGRAM TRACING: CPT

## 2022-04-06 PROCEDURE — 71046 X-RAY EXAM CHEST 2 VIEWS: CPT

## 2022-04-06 PROCEDURE — 36415 COLL VENOUS BLD VENIPUNCTURE: CPT | Performed by: STUDENT IN AN ORGANIZED HEALTH CARE EDUCATION/TRAINING PROGRAM

## 2022-04-06 PROCEDURE — 85027 COMPLETE CBC AUTOMATED: CPT | Performed by: STUDENT IN AN ORGANIZED HEALTH CARE EDUCATION/TRAINING PROGRAM

## 2022-04-06 PROCEDURE — 80048 BASIC METABOLIC PNL TOTAL CA: CPT | Performed by: STUDENT IN AN ORGANIZED HEALTH CARE EDUCATION/TRAINING PROGRAM

## 2022-04-06 PROCEDURE — 99285 EMERGENCY DEPT VISIT HI MDM: CPT | Mod: 25

## 2022-04-06 RX ORDER — ALBUTEROL SULFATE 0.83 MG/ML
2.5 SOLUTION RESPIRATORY (INHALATION)
Status: DISCONTINUED | OUTPATIENT
Start: 2022-04-06 | End: 2022-04-06

## 2022-04-06 RX ORDER — PREDNISONE 20 MG/1
60 TABLET ORAL ONCE
Status: DISCONTINUED | OUTPATIENT
Start: 2022-04-06 | End: 2022-04-06

## 2022-04-06 RX ORDER — METHYLPREDNISOLONE SODIUM SUCCINATE 125 MG/2ML
125 INJECTION, POWDER, LYOPHILIZED, FOR SOLUTION INTRAMUSCULAR; INTRAVENOUS ONCE
Status: COMPLETED | OUTPATIENT
Start: 2022-04-06 | End: 2022-04-06

## 2022-04-06 RX ORDER — PREDNISONE 20 MG/1
40 TABLET ORAL DAILY
Qty: 8 TABLET | Refills: 0 | Status: SHIPPED | OUTPATIENT
Start: 2022-04-06 | End: 2022-04-10

## 2022-04-06 RX ORDER — NICOTINE 4 MG/1
INHALANT RESPIRATORY (INHALATION)
Qty: 6 EACH | Refills: 0 | Status: SHIPPED | OUTPATIENT
Start: 2022-04-06

## 2022-04-06 RX ORDER — IPRATROPIUM BROMIDE AND ALBUTEROL SULFATE 2.5; .5 MG/3ML; MG/3ML
3 SOLUTION RESPIRATORY (INHALATION)
Status: DISCONTINUED | OUTPATIENT
Start: 2022-04-06 | End: 2022-04-06 | Stop reason: HOSPADM

## 2022-04-06 RX ADMIN — METHYLPREDNISOLONE SODIUM SUCCINATE 125 MG: 125 INJECTION, POWDER, FOR SOLUTION INTRAMUSCULAR; INTRAVENOUS at 13:13

## 2022-04-06 RX ADMIN — IPRATROPIUM BROMIDE AND ALBUTEROL SULFATE 3 ML: 2.5; .5 SOLUTION RESPIRATORY (INHALATION) at 13:12

## 2022-04-06 RX ADMIN — IPRATROPIUM BROMIDE AND ALBUTEROL SULFATE 3 ML: 2.5; .5 SOLUTION RESPIRATORY (INHALATION) at 14:32

## 2022-04-06 NOTE — ED PROVIDER NOTES
History     Chief Complaint:    Shortness of Breath and Chest Pain      HPI   Carlotta Arce is a 56 year old female with a history of moderate persistent asthma, who presents with shortness of breath. Patient notes that she was previously prescribed symbicort as a maintenance inhaler, but cannot afford it because she does not have insurance. She notes that she uses her albuterol inhaler once every other day. She also uses an albuterol nebulizer twice daily regardless of her symptoms.     She notes that she has been more short of breath since 2AM this morning. She initially tried her albuterol inhaler, and when it did not help, she switched to using her albuterol nebulizer every 3 hours. Her last nebulizer treatment was at 9:30AM. She came into the ED due to persistent dyspnea despite repeated nebulizer treatments. She is also endorsing chest pain, that she describes as located in the center of her chest, non-radiating. She notes that it is not similar to her GERD chest pain, but is similar to chest pain that he always has with an asthma exacerbation.     She has a history of a lower extremity DVT several years ago. Denies any recent travel or prolonged immobilization.     Review of Systems  Review Of Systems  Skin: negative  Eyes: negative  Ears/Nose/Throat: negative  Respiratory: Shortness of breath, Dyspnea on exertion, No cough and No hemoptysis  Cardiovascular: positive for chest pain  Gastrointestinal: reflux  Genitourinary: negative  Musculoskeletal: negative  Neurologic: negative  Psychiatric: negative  Hematologic/Lymphatic/Immunologic: negative  Endocrine: negative    Allergies:    No Known Allergies    Medications:      albuterol (PROAIR HFA) 108 (90 Base) MCG/ACT inhaler  albuterol (PROVENTIL) (2.5 MG/3ML) 0.083% neb solution  budesonide-formoterol (SYMBICORT) 80-4.5 MCG/ACT Inhaler  dexamethasone (DECADRON) 4 MG tablet  ipratropium - albuterol 0.5 mg/2.5 mg/3 mL (DUONEB) 0.5-2.5 (3) MG/3ML  neb solution  predniSONE (DELTASONE) 20 MG tablet      Past Medical History:      Past Medical History:   Diagnosis Date     Kyphoscoliosis and scoliosis      Moderate persistent asthma      Patient Active Problem List    Diagnosis Date Noted     Health Care Home 2012     Priority: Medium     X  EMERGENCY CARE PLAN  Presenting Problem Signs and Symptoms Treatment Plan    Questions or concerns during clinic hours    I will call the clinic directly     Questions or concerns outside clinic hours    I will call the 24 hour nurse line at [phone]    Patient needs to schedule an appointment    I will call the 24 hour scheduling team at [phone] or clinic directly    Same day treatment     I will call the clinic first, nurse line if after hours, urgent care and express care if needed                            DX V65.8 REPLACED WITH [id number] [facility] CARE HOME (2013)       CARDIOVASCULAR SCREENING; LDL GOAL LESS THAN 160 10/31/2010     Priority: Medium     FAMILY HX OVARIAN MALIGNANCY-DTR 2007     Priority: Medium     Allergic state      Priority: Medium     Problem list name updated by automated process. Provider to review       Moderate persistent asthma      Priority: Medium     Tobacco use disorder      Priority: Medium        Past Surgical History:      Past Surgical History:   Procedure Laterality Date      SECTION      x3     CHOLECYSTECTOMY, LAPOROSCOPIC       TUBAL LIGATION      Became pregnant after this first tubal ligation     TUBAL LIGATION      Repeat       Family History:      Family History   Problem Relation Age of Onset     Asthma Mother      Cancer Mother         lung ca     Asthma Father      Cancer Child         Ovarian     Diabetes No family hx of      C.A.D. No family hx of      Hypertension No family hx of      Breast Cancer No family hx of      Cancer - colorectal No family hx of        Social History:  Lives at home with her . Is a chronic smoker,  1PPD.     Physical Exam     Patient Vitals for the past 24 hrs:   BP Temp Temp src Pulse Resp SpO2   04/06/22 1215 (!) 128/90 98  F (36.7  C) Oral 75 20 99 %       Physical Exam  Vitals reviewed.   Constitutional:       Appearance: Normal appearance.   HENT:      Head: Normocephalic.   Eyes:      Conjunctiva/sclera: Conjunctivae normal.   Cardiovascular:      Rate and Rhythm: Normal rate and regular rhythm.      Heart sounds: Normal heart sounds.   Pulmonary:      Effort: Pulmonary effort is normal.      Breath sounds: Wheezing (diffuse inspiratory+expiratory wheezing ) present.   Musculoskeletal:         General: No swelling.   Skin:     General: Skin is warm and dry.   Neurological:      Mental Status: She is alert.   Psychiatric:         Mood and Affect: Mood normal.         Behavior: Behavior normal.         Thought Content: Thought content normal.         Judgment: Judgment normal.       Emergency Department Course   ECG:  ECG taken at 1307, ECG read at 1317  Normal sinus rhythm  Normal ECG    No significant change as compared to prior, dated 1/23/21.  Rate 66 bpm. NV interval 150 ms. QRS duration 86 ms. QT/QTc 398/417 ms. P-R-T axes 56 47 63.     Imaging:  No orders to display     Laboratory:  Labs Ordered and Resulted from Time of ED Arrival to Time of ED Departure - No data to display    Procedures:  None    Emergency Department Course:     Reviewed:  I reviewed nursing notes, vitals and past history    Assessments:  1240 I obtained history and examined the patient as noted above.   1400 I rechecked the patient and explained findings of asthma exacerbation, acute treatment plan, and need for maintenance inhaler to avoid future exacerbations.     Consults:   none    Interventions:    Medications   ipratropium - albuterol 0.5 mg/2.5 mg/3 mL (DUONEB) neb solution 3 mL (has no administration in time range)   methylPREDNISolone sodium succinate (solu-MEDROL) injection 125 mg (has no administration in time range)        Disposition:  The patient was discharged to home.    Impression & Plan      Medical Decision Making:    Patient with history of moderate persistent asthma, presenting with acute asthma exacerbation. No significant lab findings. Chest x-ray and EKG were normal. Patient was treated with duonebs x2, and 125mg IV solumedrol, with significant improvement in her symptoms. Patient has been prescribed a maintenance inhaler in the past, but cannot afford it due to lack of insurance. We discussed the importance of maintenance inhaler to prevent future exacerbations. On discharge, she was prescribed 40mg PO prednisone for 4 days, as well as advair diskus with a goodRX coupon. Patient can also consider applying for cost savings for symbicort through the website, and this information was provided as well.     Patient is also a chronic smoker, interested in smoking cessation. She has not been able to tolerate patches and gum in the past. Expressed interest in trying nicotine inhaler, which was also prescribed today.       Diagnosis:    ICD-10-CM    1. Asthma  J45.909      Discharge Medications:  Discharge Medication List as of 4/6/2022  3:26 PM      START taking these medications    Details   fluticasone-salmeterol (ADVAIR DISKUS) 250-50 MCG/DOSE inhaler Inhale 1 puff into the lungs 2 times daily, Disp-60 each, R-0, Local Print      nicotine (NICOTROL) 10 MG inhaler Use cartridge for 20 mins, Disp-6 each, R-0, Local Print                Lynda Mota MD  Resident  04/06/22 2934

## 2022-04-06 NOTE — DISCHARGE INSTRUCTIONS
GoodRx:  GoodRx is a free prescription discount program available as an edmund on your smart phone or tablet.  You can enter your medications to see the estimated prices at different local pharmacies.  The edmund also contains discount codes that can be shown and used instead of insurance to obtain a reduced price.    Other discount programs include Script Save (used by [employer]) and [place] Drug Card (http://[email or web address].Singspiel/)    [email or web address].[email or web address]  Website for a discount program through [employer]

## 2022-04-06 NOTE — ED TRIAGE NOTES
Pt states her asthma began acting up around 2am, using nebs and inhaler at home with no relief. C/o chest pain also. ABC's intact, alert and oriented X3.

## 2022-04-06 NOTE — LETTER
[facility] EMERGENCY DEPT  [address]  [facility] [address]  Phone: [phone]  Fax: [phone]    April 6, 2022        Carlotta Arce  [address]  [facility] [address]          To whom it may concern:    RE: Carlotta Arce    Patient was seen and treated today at our ED and missed work.    Please contact me for questions or concerns.      Sincerely,        Lynda Mota MD

## 2022-07-20 NOTE — DISCHARGE INSTRUCTIONS
Discharge Instructions  Asthma    Asthma is a condition causing narrowing and inflammation of the airways that can make it hard to breathe.  Asthma can also cause cough, wheezing, noisy breathing and tightness in the chest.  Asthma can be brought on or  triggered  by many things, including dust, mold, pollen, cigarette smoke, exercise, stress and infections (like the common cold).     Generally, every Emergency Department visit should have a follow-up clinic visit with either a primary or a specialty clinic/provider. Please follow-up as instructed by your emergency provider today.    Return to the Emergency Department if:  Your breathing gets worse.  You need to use your inhaler more often than every 4 hours, or cannot get relief from your inhaler.  You are very weak, or feel much more ill.  You develop new symptoms, such as chest pain.  You cough up blood.  You are vomiting (throwing up) enough that you cannot keep fluids or your medicine down.    What can I do to help myself?  Fill any prescriptions the provider gave you and take them right away--especially antibiotics. Be sure to finish the whole antibiotic prescription.  You may be given a prescription for an inhaler, which can help loosen tight air passages.  Use this as needed, but not more often than directed. Inhalers work much better when used with a spacer.   You may be given a prescription for a steroid to reduce inflammation. Used long-term, these can have some side effects, but for short-term use they are safe. You may notice restlessness or increased appetite (eating more).      You may use non-prescription cough or cold medicines. Cough medicines may help, but do not make the cough go away completely.   Avoid smoke, because this can make you feel worse. If you smoke, this may be a good time to quit! Consider using nicotine lozenges, gum, or patches to reduce cravings.   If you have a fever, Tylenol  (acetaminophen), Motrin  (ibuprofen), or Advil   (ibuprofen), may help bring fever down and may help you feel more comfortable. Be sure to read and follow the package directions, and ask your provider if you have questions.  Be sure to get your flu shot each year.  For certain age groups, the pneumonia shot can help prevent pneumonia.  It is important that you follow up with your regular provider, to be sure that you are improving from this spell (an acute asthma exacerbation), and also to do what you can to keep from having trouble again. Sometimes you need long-term medicines to keep your asthma under control.   If you were given a prescription for medicine here today, be sure to read all of the information (including the package insert) that comes with your prescription.  This will include important information about the medicine, its side effects, and any warnings that you need to know about.  The pharmacist who fills the prescription can provide more information and answer questions you may have about the medicine.  If you have questions or concerns that the pharmacist cannot address, please call or return to the Emergency Department.   Remember that you can always come back to the Emergency Department if you are not able to see your regular provider in the amount of time listed above, if you get any new symptoms, or if there is anything that worries you.    Yes

## 2023-06-19 ENCOUNTER — HOSPITAL ENCOUNTER (EMERGENCY)
Facility: CLINIC | Age: 58
Discharge: HOME | End: 2023-06-19
Attending: EMERGENCY MEDICINE | Admitting: EMERGENCY MEDICINE
Payer: COMMERCIAL

## 2023-06-19 VITALS
HEART RATE: 86 BPM | BODY MASS INDEX: 25.83 KG/M2 | WEIGHT: 127.87 LBS | RESPIRATION RATE: 18 BRPM | SYSTOLIC BLOOD PRESSURE: 145 MMHG | DIASTOLIC BLOOD PRESSURE: 85 MMHG | OXYGEN SATURATION: 99 %

## 2023-06-19 DIAGNOSIS — S61.212A LACERATION OF RIGHT MIDDLE FINGER WITHOUT FOREIGN BODY WITHOUT DAMAGE TO NAIL, INITIAL ENCOUNTER: ICD-10-CM

## 2023-06-19 DIAGNOSIS — Z92.29 HX OF LONG TERM USE OF BLOOD THINNERS: ICD-10-CM

## 2023-06-19 PROCEDURE — 12001 RPR S/N/AX/GEN/TRNK 2.5CM/<: CPT

## 2023-06-19 PROCEDURE — 99283 EMERGENCY DEPT VISIT LOW MDM: CPT

## 2023-06-20 NOTE — ED PROVIDER NOTES
History     Chief Complaint:  Laceration       HPI   Carlotta Arce is a 57 year old left handed female who presents with a laceration. The patient reports that she cut her right third finger accidentally with a steak knife. There is a laceration to the fingertip.  Couldn't get bleeding to stop at home.  She feels somewhat weak, and is concerned as she is currently on blood thinners. Admits to drinking alcohol tonight, and she was driven here by a family member/friend and is not driving. Tetanus was updated within the last 3-4 years for her work.     Independent Historian:   None - Patient Only    Review of External Notes:   NA     Medications:    Albuterol inhaler    Past Medical History:    Asthma  Tobacco use disorder  Kyphoscoliosis and scoliosis     Past Surgical History:     section x3  Cholecystectomy  Tubal ligation x2     Physical Exam     Patient Vitals for the past 24 hrs:   BP Pulse Resp SpO2 Weight   23 (!) 145/85 86 18 99 % 58 kg (127 lb 13.9 oz)        Physical Exam  Resp:  Non-labored  Neuro:  Alert and cooperative  MSkel:  Moving all extremities  Skin:  Curvilinear partial superficial fingertip avulsion R middle finger, close to but not involving distal nail.    Emergency Department Course     Procedures     Laceration Repair      Procedure: Laceration Repair    Indication: Laceration    Consent: Verbal    Location: Right third finger    Length: 1.1 cm    Preparation: Irrigation with Sterile Saline.    Anesthesia/Sedation: None      Treatment/Exploration: Wound explored, no foreign bodies found     Closure: The wound was closed with DermaBond.    Patient Status: The patient tolerated the procedure well: Yes. There were no complications.    Emergency Department Course & Assessments:     Interventions:  Medications - No data to display     Assessments:   I obtained history and examined the patient as noted above.    Laceration repair, see procedure note above.    2028 Upon discharge, the laceration continued to bleed through. Wound re-bandaged.     Independent Interpretation (X-rays, CTs, rhythm strip):  None     Consultations/Discussion of Management or Tests:  None      Social Determinants of Health affecting care:   None    Disposition:  The patient was discharged to home.     Impression & Plan      Medical Decision Making:  Carlotta Arce is a 57 year old female presented with a laceration on the right middle finger. Tetanus is up to date per patient and not available in [facility]. The wound was carefully evaluated and explored.  The laceration was closed with as noted above.  There is no evidence of muscular, tendon, or bony damage with this laceration.  No signs of foreign body.  No x-ray indicated.  Possible complications (infection, scarring) and reasons for immediate re-evaluation were reviewed with her.  Is on a blood thinner but not anticipated to have lost significant blood from this wound.  Blood work not checked.  Alcohol mentioned by patient.  No obvious complication from this and has a .    After the initial lac repair she noted blood on the guaze.  Also oozing from distal edge of nail without damage to nail or visible injury to nailbed and no subungal hematoma.  Possible that at time of injury skin avulsed away from distal edge of nail.  Additional adhesive to distal edge of nail and no further bleeding noted.  New dressing applied.    Diagnosis:    ICD-10-CM    1. Laceration of right middle finger without foreign body without damage to nail, initial encounter  S61.212A       2. Hx of long term use of blood thinners  Z92.29            Discharge Medications:  Discharge Medication List as of 6/19/2023  8:22 PM         Scribe Disclosure:  I, Fina Curiel, am serving as a scribe at 8:11 PM on 6/19/2023 to document services personally performed by Macarena Ferrara MD based on my observations and the provider's statements to me.      6/19/2023    Macarena Ferrara MD Gosen, Christine Leigh, MD  06/20/23 010

## 2023-06-20 NOTE — ED TRIAGE NOTES
Pt arrives with laceration to right third finger tonight with a stake knife. Pt states currently on blood thinners. ABcs intact and AOx4     Triage Assessment     Row Name 06/19/23 1956       Triage Assessment (Adult)    Airway WDL WDL       Respiratory WDL    Respiratory WDL WDL       Peripheral/Neurovascular WDL    Peripheral Neurovascular WDL WDL

## 2023-10-18 ENCOUNTER — OFFICE VISIT (OUTPATIENT)
Dept: URGENT CARE | Facility: URGENT CARE | Age: 58
End: 2023-10-18

## 2023-10-18 VITALS
TEMPERATURE: 98.1 F | OXYGEN SATURATION: 98 % | SYSTOLIC BLOOD PRESSURE: 138 MMHG | HEART RATE: 86 BPM | DIASTOLIC BLOOD PRESSURE: 72 MMHG | RESPIRATION RATE: 20 BRPM

## 2023-10-18 DIAGNOSIS — S63.502A WRIST SPRAIN, LEFT, INITIAL ENCOUNTER: Primary | ICD-10-CM

## 2023-10-18 PROCEDURE — 99203 OFFICE O/P NEW LOW 30 MIN: CPT | Performed by: PHYSICIAN ASSISTANT

## 2023-10-18 NOTE — LETTER
REPORT OF WORK COMP    [facility] [address] [id number] [phone]      PATIENT DATA    Employee Name: Carlotta Arce      : 1965     #: [id number]    Work related injury: Yes  Employer at time of injury: [employer]  Employer contact & phone:   Employed elsewhere? No  Workers' Compensation Carrier/Managed Care Plan:      Today's date: 10/18/2023  Date of injury: 10/18/23  Date of first visit: 10/18/23    PROVIDER EVALUATION: Please fill in as needed.  Please give copy to employee for employer.    1. Diagnosis: left thumb sprain    2. Treatment: rest, ice, splint, Aleve.  3. Medication: Aleve  NOTE: When ordering a medication, [place] Rules require Work Comp or WC on prescriptions.    5. Return to work date: 10/18/23   ** WITH RESTRICTIONS? Yes, with work restrictions: * Restricted use of hand: Left - No use  DURATION OF LIMITATIONS: 10/22/23      RESTRICTIONS: Unlimited unless listed.  Restrictions apply to home and leisure also.  If work restrictions is not available, the employee is totally disabled.    Maximum Medical Improvement (Date): TBD  Any Permanent Partial Disability? Deferred to future exam/consult.    Medical Examiner: Nayana Desai PA-C         Next appointment: 5 days as needed    CC: Employer, Managed Care Plan/Payor, Patient

## 2023-10-18 NOTE — PROGRESS NOTES
URGENT CARE VISIT:    SUBJECTIVE:   Chief Complaint   Patient presents with    Wrist Injury     Pt was working and lifting boxes and hurt left wrist       Carlotta Arce is a 58 year old female who presents with a chief complaint of left wrist pain.  Symptoms began today at work. She injured it lifting boxes.  Pain exacerbated by movement. Relieved by rest.  She treated it initially with no therapy. This is the first time this type of injury has occurred to this patient.     PMH:   Past Medical History:   Diagnosis Date    Kyphoscoliosis and scoliosis     Moderate persistent asthma      Allergies: Patient has no known allergies.   Medications:   Current Outpatient Medications   Medication Sig Dispense Refill    albuterol (PROAIR HFA) 108 (90 Base) MCG/ACT inhaler Inhale 4-6 puffs into the lungs every 4 hours as needed for shortness of breath / dyspnea or wheezing 18 g 11    albuterol (PROVENTIL) (2.5 MG/3ML) 0.083% neb solution Take 1 vial (2.5 mg) by nebulization every 4 hours as needed for shortness of breath / dyspnea or wheezing 200 mL 1    budesonide-formoterol (SYMBICORT) 80-4.5 MCG/ACT Inhaler Inhale 2 puffs into the lungs 2 times daily 10.2 g 11    dexamethasone (DECADRON) 4 MG tablet Take 2 tablets (8 mg) by mouth once as needed (wheezing, asthma flare) 15 tablet 0    ipratropium - albuterol 0.5 mg/2.5 mg/3 mL (DUONEB) 0.5-2.5 (3) MG/3ML neb solution Take 1 vial (3 mLs) by nebulization every 4 hours as needed for shortness of breath / dyspnea 1 Box 1    nicotine (NICOTROL) 10 MG inhaler Use cartridge for 20 mins 6 each 0    fluticasone-salmeterol (ADVAIR DISKUS) 250-50 MCG/DOSE inhaler Inhale 1 puff into the lungs 2 times daily 60 each 0     Social History:   Social History     Tobacco Use    Smoking status: Every Day     Packs/day: 0.50     Years: 21.00     Additional pack years: 0.00     Total pack years: 10.50     Types: Cigarettes    Smokeless tobacco: Never   Substance Use Topics    Alcohol  use: Yes     Comment: 1-2x/wk       ROS:  Review of systems negative except as stated above.    OBJECTIVE:  /72   Pulse 86   Temp 98.1  F (36.7  C) (Tympanic)   Resp 20   SpO2 98%   GENERAL APPEARANCE: healthy, alert and no distress  MUSCULOSKELETAL: moderate TTP over left lateral wrist extending to proximal thumb. Limited ROM of thumb due to pain.  EXTREMITIES: peripheral pulses normal  SKIN: no edema over left hand  NEURO: sensation intact       ASSESSMENT:    ICD-10-CM    1. Wrist sprain, left, initial encounter  S63.502A Wrist/Arm Supplies Order Wrist Brace; Left; with thumb spica          PLAN:  Patient Instructions   Patient was educated on the natural course of injury.  Conservative measures discussed including rest, ice, compression, elevation, and over-the-counter analgesics (Tylenol or Ibuprofen) as needed. See your primary care provider if symptoms worsen or do not improve in 7 days. Seek emergency care if you develop severe pain/swelling, inability to move extremity, skin paleness, or weakness.     Patient verbalized understanding and is agreeable to plan. The patient was discharged ambulatory and in stable condition.    Nayana Desai PA-C on 10/18/2023 at 7:30 PM

## 2023-10-18 NOTE — PATIENT INSTRUCTIONS
Patient was educated on the natural course of injury.  Conservative measures discussed including rest, ice, compression, elevation, and over-the-counter analgesics (Tylenol or Ibuprofen) as needed. See your primary care provider if symptoms worsen or do not improve in 7 days. Seek emergency care if you develop severe pain/swelling, inability to move extremity, skin paleness, or weakness.

## 2023-11-08 NOTE — ED AVS SNAPSHOT
[facility] Emergency Department  [address]  [facility] [address]  Phone:  [phone]  Fax:  [phone]                                    Carlotta Arce   MRN: [id number]    Department:  [facility] Emergency Department   Date of Visit:  2/5/2019           After Visit Summary Signature Page    I have received my discharge instructions, and my questions have been answered. I have discussed any challenges I see with this plan with the nurse or doctor.    ..........................................................................................................................................  Patient/Patient Representative Signature      ..........................................................................................................................................  Patient Representative Print Name and Relationship to Patient    ..................................................               ................................................  Date                                   Time    ..........................................................................................................................................  Reviewed by Signature/Title    ...................................................              ..............................................  Date                                               Time          22EPIC Rev 08/18       
no

## 2024-05-21 ENCOUNTER — APPOINTMENT (OUTPATIENT)
Dept: CT IMAGING | Facility: CLINIC | Age: 59
End: 2024-05-21
Attending: EMERGENCY MEDICINE
Payer: COMMERCIAL

## 2024-05-21 ENCOUNTER — HOSPITAL ENCOUNTER (EMERGENCY)
Facility: CLINIC | Age: 59
Discharge: HOME | End: 2024-05-21
Attending: EMERGENCY MEDICINE | Admitting: EMERGENCY MEDICINE
Payer: COMMERCIAL

## 2024-05-21 ENCOUNTER — APPOINTMENT (OUTPATIENT)
Dept: GENERAL RADIOLOGY | Facility: CLINIC | Age: 59
End: 2024-05-21
Attending: EMERGENCY MEDICINE
Payer: COMMERCIAL

## 2024-05-21 VITALS
HEART RATE: 88 BPM | OXYGEN SATURATION: 95 % | SYSTOLIC BLOOD PRESSURE: 136 MMHG | BODY MASS INDEX: 25.65 KG/M2 | RESPIRATION RATE: 18 BRPM | WEIGHT: 127 LBS | DIASTOLIC BLOOD PRESSURE: 83 MMHG | TEMPERATURE: 98 F

## 2024-05-21 DIAGNOSIS — F10.929 ACUTE ALCOHOLIC INTOXICATION WITH COMPLICATION (H): ICD-10-CM

## 2024-05-21 DIAGNOSIS — K20.90 ESOPHAGITIS: ICD-10-CM

## 2024-05-21 LAB
ALBUMIN SERPL BCG-MCNC: 4.2 G/DL (ref 3.5–5.2)
ALP SERPL-CCNC: 76 U/L (ref 40–150)
ALT SERPL W P-5'-P-CCNC: 15 U/L (ref 0–50)
ANION GAP SERPL CALCULATED.3IONS-SCNC: 10 MMOL/L (ref 7–15)
AST SERPL W P-5'-P-CCNC: 27 U/L (ref 0–45)
BASOPHILS # BLD AUTO: 0.1 10E3/UL (ref 0–0.2)
BASOPHILS NFR BLD AUTO: 2 %
BILIRUB SERPL-MCNC: 0.2 MG/DL
BUN SERPL-MCNC: 13.3 MG/DL (ref 6–20)
CALCIUM SERPL-MCNC: 8.9 MG/DL (ref 8.6–10)
CHLORIDE SERPL-SCNC: 101 MMOL/L (ref 98–107)
CREAT SERPL-MCNC: 0.68 MG/DL (ref 0.51–0.95)
D DIMER PPP FEU-MCNC: 0.32 UG/ML FEU (ref 0–0.5)
DEPRECATED HCO3 PLAS-SCNC: 29 MMOL/L (ref 22–29)
EGFRCR SERPLBLD CKD-EPI 2021: >90 ML/MIN/1.73M2
EOSINOPHIL # BLD AUTO: 0.1 10E3/UL (ref 0–0.7)
EOSINOPHIL NFR BLD AUTO: 2 %
ERYTHROCYTE [DISTWIDTH] IN BLOOD BY AUTOMATED COUNT: 20 % (ref 10–15)
ETHANOL SERPL-MCNC: 0.15 G/DL
GLUCOSE SERPL-MCNC: 103 MG/DL (ref 70–99)
HCT VFR BLD AUTO: 36.2 % (ref 35–47)
HGB BLD-MCNC: 10.9 G/DL (ref 11.7–15.7)
HOLD SPECIMEN: NORMAL
IMM GRANULOCYTES # BLD: 0 10E3/UL
IMM GRANULOCYTES NFR BLD: 0 %
LIPASE SERPL-CCNC: 77 U/L (ref 13–60)
LYMPHOCYTES # BLD AUTO: 2.2 10E3/UL (ref 0.8–5.3)
LYMPHOCYTES NFR BLD AUTO: 46 %
MCH RBC QN AUTO: 21.1 PG (ref 26.5–33)
MCHC RBC AUTO-ENTMCNC: 30.1 G/DL (ref 31.5–36.5)
MCV RBC AUTO: 70 FL (ref 78–100)
MONOCYTES # BLD AUTO: 0.4 10E3/UL (ref 0–1.3)
MONOCYTES NFR BLD AUTO: 9 %
NEUTROPHILS # BLD AUTO: 1.9 10E3/UL (ref 1.6–8.3)
NEUTROPHILS NFR BLD AUTO: 41 %
NRBC # BLD AUTO: 0 10E3/UL
NRBC BLD AUTO-RTO: 0 /100
PLATELET # BLD AUTO: 385 10E3/UL (ref 150–450)
POTASSIUM SERPL-SCNC: 4 MMOL/L (ref 3.4–5.3)
PROT SERPL-MCNC: 7.4 G/DL (ref 6.4–8.3)
RBC # BLD AUTO: 5.16 10E6/UL (ref 3.8–5.2)
SODIUM SERPL-SCNC: 140 MMOL/L (ref 135–145)
TROPONIN T SERPL HS-MCNC: 9 NG/L
WBC # BLD AUTO: 4.7 10E3/UL (ref 4–11)

## 2024-05-21 PROCEDURE — 99285 EMERGENCY DEPT VISIT HI MDM: CPT | Mod: 25

## 2024-05-21 PROCEDURE — 36415 COLL VENOUS BLD VENIPUNCTURE: CPT | Performed by: EMERGENCY MEDICINE

## 2024-05-21 PROCEDURE — 83690 ASSAY OF LIPASE: CPT | Performed by: EMERGENCY MEDICINE

## 2024-05-21 PROCEDURE — 85379 FIBRIN DEGRADATION QUANT: CPT | Performed by: EMERGENCY MEDICINE

## 2024-05-21 PROCEDURE — 250N000013 HC RX MED GY IP 250 OP 250 PS 637: Performed by: EMERGENCY MEDICINE

## 2024-05-21 PROCEDURE — 96361 HYDRATE IV INFUSION ADD-ON: CPT

## 2024-05-21 PROCEDURE — 96375 TX/PRO/DX INJ NEW DRUG ADDON: CPT

## 2024-05-21 PROCEDURE — 250N000011 HC RX IP 250 OP 636: Performed by: EMERGENCY MEDICINE

## 2024-05-21 PROCEDURE — 250N000009 HC RX 250: Performed by: EMERGENCY MEDICINE

## 2024-05-21 PROCEDURE — 93005 ELECTROCARDIOGRAM TRACING: CPT

## 2024-05-21 PROCEDURE — 71045 X-RAY EXAM CHEST 1 VIEW: CPT

## 2024-05-21 PROCEDURE — 94640 AIRWAY INHALATION TREATMENT: CPT

## 2024-05-21 PROCEDURE — 80053 COMPREHEN METABOLIC PANEL: CPT | Performed by: EMERGENCY MEDICINE

## 2024-05-21 PROCEDURE — 82077 ASSAY SPEC XCP UR&BREATH IA: CPT | Performed by: EMERGENCY MEDICINE

## 2024-05-21 PROCEDURE — 96374 THER/PROPH/DIAG INJ IV PUSH: CPT | Mod: 59

## 2024-05-21 PROCEDURE — 84484 ASSAY OF TROPONIN QUANT: CPT | Performed by: EMERGENCY MEDICINE

## 2024-05-21 PROCEDURE — 258N000003 HC RX IP 258 OP 636: Performed by: EMERGENCY MEDICINE

## 2024-05-21 PROCEDURE — 85025 COMPLETE CBC W/AUTO DIFF WBC: CPT | Performed by: EMERGENCY MEDICINE

## 2024-05-21 PROCEDURE — C9113 INJ PANTOPRAZOLE SODIUM, VIA: HCPCS | Performed by: EMERGENCY MEDICINE

## 2024-05-21 PROCEDURE — 74177 CT ABD & PELVIS W/CONTRAST: CPT

## 2024-05-21 RX ORDER — LIDOCAINE HYDROCHLORIDE 20 MG/ML
15 SOLUTION OROPHARYNGEAL ONCE
Status: COMPLETED | OUTPATIENT
Start: 2024-05-21 | End: 2024-05-21

## 2024-05-21 RX ORDER — SUCRALFATE 1 G/1
1 TABLET ORAL 4 TIMES DAILY
Qty: 40 TABLET | Refills: 0 | Status: SHIPPED | OUTPATIENT
Start: 2024-05-21 | End: 2024-05-31

## 2024-05-21 RX ORDER — ONDANSETRON 4 MG/1
4 TABLET, ORALLY DISINTEGRATING ORAL EVERY 8 HOURS PRN
Qty: 10 TABLET | Refills: 0 | Status: SHIPPED | OUTPATIENT
Start: 2024-05-21 | End: 2024-05-24

## 2024-05-21 RX ORDER — PANTOPRAZOLE SODIUM 40 MG/1
40 TABLET, DELAYED RELEASE ORAL DAILY
Qty: 30 TABLET | Refills: 0 | Status: SHIPPED | OUTPATIENT
Start: 2024-05-21 | End: 2024-06-20

## 2024-05-21 RX ORDER — IOPAMIDOL 755 MG/ML
500 INJECTION, SOLUTION INTRAVASCULAR ONCE
Status: COMPLETED | OUTPATIENT
Start: 2024-05-21 | End: 2024-05-21

## 2024-05-21 RX ORDER — DIPHENHYDRAMINE HYDROCHLORIDE 50 MG/ML
25 INJECTION INTRAMUSCULAR; INTRAVENOUS ONCE
Status: COMPLETED | OUTPATIENT
Start: 2024-05-21 | End: 2024-05-21

## 2024-05-21 RX ORDER — LORAZEPAM 2 MG/ML
1 INJECTION INTRAMUSCULAR ONCE
Status: COMPLETED | OUTPATIENT
Start: 2024-05-21 | End: 2024-05-21

## 2024-05-21 RX ORDER — MAGNESIUM HYDROXIDE/ALUMINUM HYDROXICE/SIMETHICONE 120; 1200; 1200 MG/30ML; MG/30ML; MG/30ML
15 SUSPENSION ORAL ONCE
Status: COMPLETED | OUTPATIENT
Start: 2024-05-21 | End: 2024-05-21

## 2024-05-21 RX ORDER — IPRATROPIUM BROMIDE AND ALBUTEROL SULFATE 2.5; .5 MG/3ML; MG/3ML
3 SOLUTION RESPIRATORY (INHALATION) ONCE
Status: COMPLETED | OUTPATIENT
Start: 2024-05-21 | End: 2024-05-21

## 2024-05-21 RX ADMIN — IOPAMIDOL 64 ML: 755 INJECTION, SOLUTION INTRAVENOUS at 21:17

## 2024-05-21 RX ADMIN — PROCHLORPERAZINE EDISYLATE 10 MG: 5 INJECTION INTRAMUSCULAR; INTRAVENOUS at 19:56

## 2024-05-21 RX ADMIN — LORAZEPAM 1 MG: 2 INJECTION INTRAMUSCULAR; INTRAVENOUS at 20:38

## 2024-05-21 RX ADMIN — LIDOCAINE HYDROCHLORIDE 15 ML: 20 SOLUTION ORAL at 21:50

## 2024-05-21 RX ADMIN — IPRATROPIUM BROMIDE AND ALBUTEROL SULFATE 3 ML: .5; 3 SOLUTION RESPIRATORY (INHALATION) at 21:51

## 2024-05-21 RX ADMIN — IPRATROPIUM BROMIDE AND ALBUTEROL SULFATE 3 ML: .5; 3 SOLUTION RESPIRATORY (INHALATION) at 20:16

## 2024-05-21 RX ADMIN — SODIUM CHLORIDE, POTASSIUM CHLORIDE, SODIUM LACTATE AND CALCIUM CHLORIDE 1000 ML: 600; 310; 30; 20 INJECTION, SOLUTION INTRAVENOUS at 19:57

## 2024-05-21 RX ADMIN — ALUMINUM HYDROXIDE, MAGNESIUM HYDROXIDE, AND SIMETHICONE 15 ML: 1200; 120; 1200 SUSPENSION ORAL at 21:50

## 2024-05-21 RX ADMIN — PANTOPRAZOLE SODIUM 40 MG: 40 INJECTION, POWDER, FOR SOLUTION INTRAVENOUS at 22:46

## 2024-05-21 RX ADMIN — DIPHENHYDRAMINE HYDROCHLORIDE 25 MG: 50 INJECTION INTRAMUSCULAR; INTRAVENOUS at 19:56

## 2024-05-21 ASSESSMENT — ACTIVITIES OF DAILY LIVING (ADL)
ADLS_ACUITY_SCORE: 35

## 2024-05-21 NOTE — LETTER
May 21, 2024      To Whom It May Concern:            Carlotta Arce was seen in our Emergency Department today, 05/21/24.  I expect her condition to improve over the next 1-2 days.  She may return to work/school when improved on 5/23/24.                    Sincerely,        Ana M Yates RN

## 2024-05-22 LAB
ATRIAL RATE - MUSE: 72 BPM
DIASTOLIC BLOOD PRESSURE - MUSE: NORMAL MMHG
INTERPRETATION ECG - MUSE: NORMAL
P AXIS - MUSE: 68 DEGREES
PR INTERVAL - MUSE: 142 MS
QRS DURATION - MUSE: 76 MS
QT - MUSE: 386 MS
QTC - MUSE: 422 MS
R AXIS - MUSE: 67 DEGREES
SYSTOLIC BLOOD PRESSURE - MUSE: NORMAL MMHG
T AXIS - MUSE: 69 DEGREES
VENTRICULAR RATE- MUSE: 72 BPM

## 2024-05-22 NOTE — ED NOTES
Bed: ED27  Expected date:   Expected time:   Means of arrival:   Comments:  Untriaged patient- chest pain

## 2024-05-22 NOTE — ED PROVIDER NOTES
"    History     Chief Complaint:  Alcohol Problem and Abdominal Pain       HPI     Carlotta Arce is a 58 year old female presents with abdominal pain.  Patient [place]  She returned home and took a nap.  When she awoke she noted severe epigastric pain that was radiating to the chest.  She became nauseated and had multiple episodes of emesis.  There is no hematemesis.  She felt short of breath with the symptoms.  She denies any history of pancreatitis.  She drinks 3-4 \"shooters\" of rum per day but son and significant other who are in the room report that she drinks at least 8 a today.  She denies history of alcohol withdrawal but son reports history of withdrawal but never required admission.      Independent Historian:    Son also reports that she has a history of suspected ulcer related to her alcohol use and has not been taking any medications for peptic ulcer disease at the time    Review of External Notes:  None    Medications:    ondansetron (ZOFRAN ODT) 4 MG ODT tab  pantoprazole (PROTONIX) 40 MG EC tablet  sucralfate (CARAFATE) 1 GM tablet  albuterol (PROAIR HFA) 108 (90 Base) MCG/ACT inhaler  albuterol (PROVENTIL) (2.5 MG/3ML) 0.083% neb solution  budesonide-formoterol (SYMBICORT) 80-4.5 MCG/ACT Inhaler  dexamethasone (DECADRON) 4 MG tablet  fluticasone-salmeterol (ADVAIR DISKUS) 250-50 MCG/DOSE inhaler  ipratropium - albuterol 0.5 mg/2.5 mg/3 mL (DUONEB) 0.5-2.5 (3) MG/3ML neb solution  nicotine (NICOTROL) 10 MG inhaler        Past Medical History:    Past Medical History:   Diagnosis Date    Kyphoscoliosis and scoliosis     Moderate persistent asthma        Past Surgical History:    Past Surgical History:   Procedure Laterality Date     SECTION      x3    CHOLECYSTECTOMY, LAPOROSCOPIC      TUBAL LIGATION      Became pregnant after this first tubal ligation    TUBAL LIGATION      Repeat          Physical Exam   Patient Vitals for " the past 24 hrs:   BP Temp Temp src Pulse Resp SpO2 Weight   05/21/24 2245 136/83 -- -- 88 -- 95 % --   05/21/24 2200 (!) 147/88 -- -- 80 -- 95 % --   05/21/24 2130 131/74 -- -- 82 -- 95 % --   05/21/24 2100 131/73 -- -- 90 18 95 % --   05/21/24 2045 132/88 -- -- 90 18 97 % --   05/21/24 2020 114/60 -- -- 89 -- 99 % --   05/21/24 1932 105/76 98  F (36.7  C) Oral 103 20 96 % 57.6 kg (127 lb)        Physical Exam      HEENT:    Oropharynx is moist  Eyes:    Conjunctiva normal  Neck:     Supple, no meningismus.     CV:     Regular rate and rhythm.      No murmurs, rubs or gallops.  PULM:    Clear to auscultation bilateral.       No respiratory distress.      Good air exchange.     No rales      Trace late expiratory wheezing     No stridor.  ABD:    Soft, non-distended.       Moderate focal tenderness in the epigastric area     Bowel sounds normal.     No pulsatile masses.       No rebound, guarding or rigidity.     No CVA tenderness.   MSK:     No gross deformity to all four extremities.   LYMPH:   No cervical lymphadenopathy.  NEURO:   Alert.  Good muscular tone, no atrophy.   Skin:    Warm, dry and intact.    Psych:    Mood is good and affect is appropriate.      Emergency Department Course   ECG  ECG results from 05/21/24   EKG 12 lead     Value    Systolic Blood Pressure     Diastolic Blood Pressure     Ventricular Rate 72    Atrial Rate 72    IA Interval 142    QRS Duration 76        QTc 422    P Axis 68    R AXIS 67    T Axis 69    Interpretation ECG      Sinus rhythm  Normal ECG  When compared with ECG of 06-APR-2022 13:07,  No significant change was found  Confirmed by - EMERGENCY ROOM, PHYSICIAN ([id number]),  PIEDAD BEYER (Sirena) on 5/22/2024 7:16:37 AM           Imaging:  CT Abdomen Pelvis w Contrast   Final Result   IMPRESSION:    1.  Partially visualized moderate distal esophageal wall thickening likely reflecting esophagitis. EGD could be considered.   2.  Moderate hiatal hernia.   3.  Uterine  fibroid, possibly submucosal versus less likely endometrial polyp. Pelvic ultrasound could be considered as clinically indicated.      XR Chest Port 1 View   Final Result   IMPRESSION: Stable dextroscoliosis. Mild left basilar pulmonary opacities likely reflect atelectasis. The right lung is clear. No pleural effusion. Normal heart size.          Laboratory:  Labs Ordered and Resulted from Time of ED Arrival to Time of ED Departure   COMPREHENSIVE METABOLIC PANEL - Abnormal       Result Value    Sodium 140      Potassium 4.0      Carbon Dioxide (CO2) 29      Anion Gap 10      Urea Nitrogen 13.3      Creatinine 0.68      GFR Estimate >90      Calcium 8.9      Chloride 101      Glucose 103 (*)     Alkaline Phosphatase 76      AST 27      ALT 15      Protein Total 7.4      Albumin 4.2      Bilirubin Total 0.2     LIPASE - Abnormal    Lipase 77 (*)    CBC WITH PLATELETS AND DIFFERENTIAL - Abnormal    WBC Count 4.7      RBC Count 5.16      Hemoglobin 10.9 (*)     Hematocrit 36.2      MCV 70 (*)     MCH 21.1 (*)     MCHC 30.1 (*)     RDW 20.0 (*)     Platelet Count 385      % Neutrophils 41      % Lymphocytes 46      % Monocytes 9      % Eosinophils 2      % Basophils 2      % Immature Granulocytes 0      NRBCs per 100 WBC 0      Absolute Neutrophils 1.9      Absolute Lymphocytes 2.2      Absolute Monocytes 0.4      Absolute Eosinophils 0.1      Absolute Basophils 0.1      Absolute Immature Granulocytes 0.0      Absolute NRBCs 0.0     ETHYL ALCOHOL LEVEL - Abnormal    Alcohol ethyl 0.15 (*)    D DIMER QUANTITATIVE - Normal    D-Dimer Quantitative 0.32     TROPONIN T, HIGH SENSITIVITY - Normal    Troponin T, High Sensitivity 9              Emergency Department Course & Assessments:    Interventions:  Medications   prochlorperazine (COMPAZINE) injection 10 mg (10 mg Intravenous $Given 5/21/24 1956)   diphenhydrAMINE (BENADRYL) injection 25 mg (25 mg Intravenous $Given 5/21/24 1956)   lactated ringers BOLUS 1,000 mL (0 mLs  Intravenous Stopped 24)   ipratropium - albuterol 0.5 mg/2.5 mg/3 mL (DUONEB) neb solution 3 mL (3 mLs Nebulization $Given 24)   LORazepam (ATIVAN) injection 1 mg (1 mg Intravenous $Given 24)   iopamidol (ISOVUE-370) solution 500 mL (64 mLs Intravenous $Given 24)   sodium chloride (PF) 0.9% PF flush 100 mL (56 mLs Intravenous $Given 24)   ipratropium - albuterol 0.5 mg/2.5 mg/3 mL (DUONEB) neb solution 3 mL (3 mLs Nebulization $Given 24)   alum & mag hydroxide-simethicone (MAALOX) suspension 15 mL (15 mLs Oral $Given 24)   lidocaine (viscous) (XYLOCAINE) 2 % solution 15 mL (15 mLs Mouth/Throat $Given 24)   pantoprazole (PROTONIX) IV push injection 40 mg (40 mg Intravenous $Given 24)        Independent Interpretation (X-rays, CTs, rhythm strip):  I independently reviewed chest x-ray which there is no pneumothorax or focal infiltrate    Consultations/Discussion of Management or Tests:  None       Social Determinants of Health affecting care:  None     Disposition:  The patient was discharged.    Impression & Plan      Medical Decision Makin-year-old female presents with epigastric pain, nausea and vomiting in the face of daily alcohol use.  She was evaluated for atypical ACS.  EKG without ischemic changes.  Troponin within normal limits.  No indication for serial enzymes.  Patient had focal reproducible epigastric pain indicating likely GI source.  Lipase was minimally elevated.  CT scan of the abdomen was performed in which there are no radiographic signs of pancreatitis.  She has radiographic signs of esophagitis which is consistent with patient's symptoms related to daily alcohol use.  Patient's symptoms remarkably improved with interventions as described above.  She was able to tolerate oral fluid challenge.  She is safe for discharge home with alcohol cessation counseling.  She will be initiated on PPI and  sucralfate.  Close follow-up with PCP.      Diagnosis:    ICD-10-CM    1. Acute alcoholic intoxication with complication (H24)  F10.929       2. Esophagitis  K20.90            Discharge Medications:  Discharge Medication List as of 5/21/2024 10:56 PM        START taking these medications    Details   ondansetron (ZOFRAN ODT) 4 MG ODT tab Take 1 tablet (4 mg) by mouth every 8 hours as needed for nausea, Disp-10 tablet, R-0, E-Prescribe      pantoprazole (PROTONIX) 40 MG EC tablet Take 1 tablet (40 mg) by mouth daily for 30 doses, Disp-30 tablet, R-0, E-Prescribe      sucralfate (CARAFATE) 1 GM tablet Take 1 tablet (1 g) by mouth 4 times daily for 10 days, Disp-40 tablet, R-0, E-Prescribe                Eduardo Mosquera MD  5/22/2024              Eduardo Mosquera MD  05/22/24 0844       Eduardo Mosquera MD  05/22/24 0896

## 2024-05-22 NOTE — DISCHARGE INSTRUCTIONS
Please make an appointment to follow up with your primary care provider in 3-5 days even if entirely better.

## 2024-05-22 NOTE — ED NOTES
Pt discharge per MD order. Pt is alert and oriented x 3 to her own ability, not appear in acute distress. VS. Stable, Cardiopulmonary status stable. Ambulatory with steady gait.Pain or discomfort improved upon discharge.  Discharge and follow-up instruction given to patient, education on alcohol cessation, risk/trigger,  pt has no learning barrier and demonstrates understanding. Pt stable to be discharge.

## 2024-05-22 NOTE — ED TRIAGE NOTES
"Pt presented with family with severe epigastrium pain with dry heaves. Pt stated she work up with severe pain and SOB. 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 Pt is alert and oriented. Pt request for \"puffer\" for asthma on arrival, but ABC intact, speak full sentence without issues      Triage Assessment (Adult)       Row Name 05/21/24 1934          Triage Assessment    Airway WDL WDL        Respiratory WDL    Respiratory WDL rhythm/pattern        Skin Circulation/Temperature WDL    Skin Circulation/Temperature WDL WDL        Cardiac WDL    Cardiac WDL WDL        Peripheral/Neurovascular WDL    Peripheral Neurovascular WDL WDL        Cognitive/Neuro/Behavioral WDL    Cognitive/Neuro/Behavioral WDL WDL        M[place]und Coma Scale    Best Eye Response 4-->(E4) spontaneous     Best Motor Response 6-->(M6) obeys commands     Best Verbal Response 5-->(V5) oriented     Emerson Coma Scale Score 15                     "

## 2024-05-23 ENCOUNTER — NURSE TRIAGE (OUTPATIENT)
Dept: NURSING | Facility: CLINIC | Age: 59
End: 2024-05-23
Payer: COMMERCIAL

## 2024-10-27 ENCOUNTER — APPOINTMENT (OUTPATIENT)
Dept: ULTRASOUND IMAGING | Facility: CLINIC | Age: 59
End: 2024-10-27
Attending: EMERGENCY MEDICINE
Payer: COMMERCIAL

## 2024-10-27 ENCOUNTER — HOSPITAL ENCOUNTER (EMERGENCY)
Facility: CLINIC | Age: 59
Discharge: HOME | End: 2024-10-27
Attending: EMERGENCY MEDICINE | Admitting: EMERGENCY MEDICINE
Payer: COMMERCIAL

## 2024-10-27 VITALS
BODY MASS INDEX: 26.98 KG/M2 | WEIGHT: 133.82 LBS | OXYGEN SATURATION: 97 % | TEMPERATURE: 98.1 F | HEART RATE: 84 BPM | SYSTOLIC BLOOD PRESSURE: 127 MMHG | DIASTOLIC BLOOD PRESSURE: 75 MMHG | RESPIRATION RATE: 18 BRPM | HEIGHT: 59 IN

## 2024-10-27 DIAGNOSIS — M79.661 RIGHT CALF PAIN: ICD-10-CM

## 2024-10-27 PROCEDURE — 93971 EXTREMITY STUDY: CPT | Mod: RT

## 2024-10-27 PROCEDURE — 99284 EMERGENCY DEPT VISIT MOD MDM: CPT | Mod: 25

## 2024-10-27 RX ORDER — CYCLOBENZAPRINE HCL 10 MG
10 TABLET ORAL 3 TIMES DAILY PRN
Qty: 10 TABLET | Refills: 0 | Status: SHIPPED | OUTPATIENT
Start: 2024-10-27

## 2024-10-27 ASSESSMENT — COLUMBIA-SUICIDE SEVERITY RATING SCALE - C-SSRS
1. IN THE PAST MONTH, HAVE YOU WISHED YOU WERE DEAD OR WISHED YOU COULD GO TO SLEEP AND NOT WAKE UP?: NO
2. HAVE YOU ACTUALLY HAD ANY THOUGHTS OF KILLING YOURSELF IN THE PAST MONTH?: NO
6. HAVE YOU EVER DONE ANYTHING, STARTED TO DO ANYTHING, OR PREPARED TO DO ANYTHING TO END YOUR LIFE?: NO

## 2024-10-27 NOTE — ED PROVIDER NOTES
"  Emergency Department Note      History of Present Illness     Chief Complaint   Leg Pain      HPI     Carlotta Arce is a 59 year old female presents with right calf pain.  Patient had the gradual onset of right calf pain beginning 4 days prior.  She denies any significant injury but reports that she does have a lot of heavy lifting while at work.  The pain is isolated the calf and is aggravated by ambulation.  She has a history of DVT and her left leg and is no longer anticoagulated.  She has no other complaints or concerns.    Independent Historian   None    Review of External Notes   None    Past Medical History     Medical History and Problem List   Past Medical History:   Diagnosis Date    Kyphoscoliosis and scoliosis     Moderate persistent asthma        Medications   cyclobenzaprine (FLEXERIL) 10 MG tablet  albuterol (PROAIR HFA) 108 (90 Base) MCG/ACT inhaler  albuterol (PROVENTIL) (2.5 MG/3ML) 0.083% neb solution  budesonide-formoterol (SYMBICORT) 80-4.5 MCG/ACT Inhaler  dexamethasone (DECADRON) 4 MG tablet  fluticasone-salmeterol (ADVAIR DISKUS) 250-50 MCG/DOSE inhaler  ipratropium - albuterol 0.5 mg/2.5 mg/3 mL (DUONEB) 0.5-2.5 (3) MG/3ML neb solution  nicotine (NICOTROL) 10 MG inhaler        Surgical History   Past Surgical History:   Procedure Laterality Date     SECTION      x3    CHOLECYSTECTOMY, LAPOROSCOPIC      TUBAL LIGATION  1996    Became pregnant after this first tubal ligation    TUBAL LIGATION      Repeat       Physical Exam     Patient Vitals for the past 24 hrs:   BP Temp Temp src Pulse Resp SpO2 Height Weight   10/27/24 1015 127/75 98.1  F (36.7  C) Oral 84 18 97 % 1.499 m (4' 11\") 60.7 kg (133 lb 13.1 oz)     Physical Exam      Eyes:    Conjunctiva normal  Neck:     Supple, no meningismus.     CV:     Regular rate and rhythm.      No murmurs, rubs or gallops.       No unilateral leg swelling.       2+ DP pulses bilateral.       No lower extremity edema.  PULM:  "   Clear to auscultation bilateral.       No respiratory distress.      Good air exchange.  ABD:    Soft, non-tender, non-distended.       No pulsatile masses.       No rebound, guarding or rigidity.  MSK:     Right lower extremity:      No appreciable swelling.      Moderate pain with compression of the calf.      Increased muscle tone to the gastrocnemius      Negative Latanya's sign.      Compartments are soft and compressible.   LYMPH:   No cervical lymphadenopathy.  NEURO:   Alert ; good muscle tone     Strength and sensation intact to the right lower extremity  Skin:    Warm, dry and intact.    Psych:    Mood is good and affect is appropriate.    Diagnostics     Lab Results   Labs Ordered and Resulted from Time of ED Arrival to Time of ED Departure - No data to display    Imaging   US Lower Extremity Venous Duplex Right   Final Result   IMPRESSION:   1.  No deep venous thrombosis in the right lower extremity.          Independent Interpretation   None    ED Course      Medications Administered   Medications - No data to display    Procedures   Procedures     Discussion of Management   None    ED Course        Additional Documentation  None    Medical Decision Making / Diagnosis       [facility]     Carlotta Arce is a 59 year old female with a history of DVT no longer anticoagulated presents with atraumatic right calf pain.  No evidence of soft tissue infection, arterial insufficiency.  Doppler ultrasound is negative for DVT.  Evaluation is consistent with muscle strain and/or spasm.  Patient will be given limited supply of Flexeril and work note provided.  Follow-up with PCP if symptoms not improving.    Disposition   The patient was discharged.     Diagnosis     ICD-10-CM    1. Right calf pain  M79.661            Discharge Medications   Discharge Medication List as of 10/27/2024 11:33 AM        START taking these medications    Details   cyclobenzaprine (FLEXERIL) 10 MG tablet Take 1 tablet (10 mg) by mouth 3  times daily as needed for muscle spasms., Disp-10 tablet, R-0, E-Prescribe               MD Demetri Angulo Jeremiah R, MD  10/27/24 8346

## 2024-10-27 NOTE — Clinical Note
Carlotta Arce was seen and treated in our emergency department on 10/27/2024.  She may return to work on 10/28/2024.  Patient cannot perform heavy lifting greater than 15 pounds for 1 week or until cleared by primary care physician.     If you have any questions or concerns, please don't hesitate to call.      Eduardo Mosquera MD

## 2024-10-27 NOTE — DISCHARGE INSTRUCTIONS
Please make an appointment to follow up with your primary care provider in 7-10 days if not improving.

## 2024-10-27 NOTE — ED TRIAGE NOTES
Patient arrives with right calf pain for 4 days. Patient states that she initially thought it was a muscle strain but it has only worsened. Patient has a hx of DVT's and states this feels similar.      Triage Assessment (Adult)       Row Name 10/27/24 1014          Triage Assessment    Airway WDL WDL        Respiratory WDL    Respiratory WDL WDL        Skin Circulation/Temperature WDL    Skin Circulation/Temperature WDL WDL        Cardiac WDL    Cardiac WDL WDL        Peripheral/Neurovascular WDL    Peripheral Neurovascular WDL WDL        Cognitive/Neuro/Behavioral WDL    Cognitive/Neuro/Behavioral WDL WDL

## 2024-12-13 ENCOUNTER — HOSPITAL ENCOUNTER (EMERGENCY)
Facility: CLINIC | Age: 59
Discharge: HOME | End: 2024-12-13
Attending: PHYSICIAN ASSISTANT | Admitting: PHYSICIAN ASSISTANT
Payer: COMMERCIAL

## 2024-12-13 ENCOUNTER — APPOINTMENT (OUTPATIENT)
Dept: GENERAL RADIOLOGY | Facility: CLINIC | Age: 59
End: 2024-12-13
Attending: PHYSICIAN ASSISTANT
Payer: COMMERCIAL

## 2024-12-13 VITALS
DIASTOLIC BLOOD PRESSURE: 57 MMHG | OXYGEN SATURATION: 98 % | TEMPERATURE: 98 F | RESPIRATION RATE: 28 BRPM | HEART RATE: 88 BPM | SYSTOLIC BLOOD PRESSURE: 127 MMHG

## 2024-12-13 DIAGNOSIS — J45.901 ASTHMA EXACERBATION: ICD-10-CM

## 2024-12-13 DIAGNOSIS — J20.9 ACUTE BRONCHITIS: ICD-10-CM

## 2024-12-13 DIAGNOSIS — R07.89 ATYPICAL CHEST PAIN: ICD-10-CM

## 2024-12-13 LAB
ANION GAP SERPL CALCULATED.3IONS-SCNC: 11 MMOL/L (ref 7–15)
ATRIAL RATE - MUSE: 74 BPM
BASE EXCESS BLDV CALC-SCNC: 0.8 MMOL/L (ref -3–3)
BASOPHILS # BLD AUTO: 0.1 10E3/UL (ref 0–0.2)
BASOPHILS NFR BLD AUTO: 1 %
BUN SERPL-MCNC: 19.6 MG/DL (ref 6–20)
CALCIUM SERPL-MCNC: 8.8 MG/DL (ref 8.8–10.4)
CHLORIDE SERPL-SCNC: 102 MMOL/L (ref 98–107)
CREAT SERPL-MCNC: 0.48 MG/DL (ref 0.51–0.95)
D DIMER PPP FEU-MCNC: <0.27 UG/ML FEU (ref 0–0.5)
DIASTOLIC BLOOD PRESSURE - MUSE: NORMAL MMHG
EGFRCR SERPLBLD CKD-EPI 2021: >90 ML/MIN/1.73M2
EOSINOPHIL # BLD AUTO: 0.1 10E3/UL (ref 0–0.7)
EOSINOPHIL NFR BLD AUTO: 1 %
ERYTHROCYTE [DISTWIDTH] IN BLOOD BY AUTOMATED COUNT: 19.1 % (ref 10–15)
FLUAV RNA SPEC QL NAA+PROBE: NEGATIVE
FLUBV RNA RESP QL NAA+PROBE: NEGATIVE
GLUCOSE SERPL-MCNC: 114 MG/DL (ref 70–99)
HCO3 BLDV-SCNC: 26 MMOL/L (ref 21–28)
HCO3 SERPL-SCNC: 23 MMOL/L (ref 22–29)
HCT VFR BLD AUTO: 33.4 % (ref 35–47)
HGB BLD-MCNC: 10.2 G/DL (ref 11.7–15.7)
IMM GRANULOCYTES # BLD: 0 10E3/UL
IMM GRANULOCYTES NFR BLD: 0 %
INTERPRETATION ECG - MUSE: NORMAL
LYMPHOCYTES # BLD AUTO: 1.9 10E3/UL (ref 0.8–5.3)
LYMPHOCYTES NFR BLD AUTO: 27 %
MCH RBC QN AUTO: 22.6 PG (ref 26.5–33)
MCHC RBC AUTO-ENTMCNC: 30.5 G/DL (ref 31.5–36.5)
MCV RBC AUTO: 74 FL (ref 78–100)
MONOCYTES # BLD AUTO: 0.5 10E3/UL (ref 0–1.3)
MONOCYTES NFR BLD AUTO: 7 %
NEUTROPHILS # BLD AUTO: 4.4 10E3/UL (ref 1.6–8.3)
NEUTROPHILS NFR BLD AUTO: 63 %
NRBC # BLD AUTO: 0 10E3/UL
NRBC BLD AUTO-RTO: 0 /100
NT-PROBNP SERPL-MCNC: <36 PG/ML (ref 0–900)
O2/TOTAL GAS SETTING VFR VENT: 21 %
OXYHGB MFR BLDV: 68 % (ref 70–75)
P AXIS - MUSE: 74 DEGREES
PCO2 BLDV: 44 MM HG (ref 40–50)
PH BLDV: 7.38 [PH] (ref 7.32–7.43)
PLATELET # BLD AUTO: 443 10E3/UL (ref 150–450)
PO2 BLDV: 38 MM HG (ref 25–47)
POTASSIUM SERPL-SCNC: 3.8 MMOL/L (ref 3.4–5.3)
PR INTERVAL - MUSE: 146 MS
PROCALCITONIN SERPL IA-MCNC: 0.05 NG/ML
QRS DURATION - MUSE: 82 MS
QT - MUSE: 384 MS
QTC - MUSE: 426 MS
R AXIS - MUSE: 59 DEGREES
RBC # BLD AUTO: 4.51 10E6/UL (ref 3.8–5.2)
RSV RNA SPEC NAA+PROBE: NEGATIVE
SAO2 % BLDV: 70 % (ref 70–75)
SARS-COV-2 RNA RESP QL NAA+PROBE: NEGATIVE
SODIUM SERPL-SCNC: 136 MMOL/L (ref 135–145)
SYSTOLIC BLOOD PRESSURE - MUSE: NORMAL MMHG
T AXIS - MUSE: 70 DEGREES
TROPONIN T SERPL HS-MCNC: <6 NG/L
VENTRICULAR RATE- MUSE: 74 BPM
WBC # BLD AUTO: 7 10E3/UL (ref 4–11)

## 2024-12-13 PROCEDURE — 36415 COLL VENOUS BLD VENIPUNCTURE: CPT | Performed by: PHYSICIAN ASSISTANT

## 2024-12-13 PROCEDURE — 250N000011 HC RX IP 250 OP 636: Performed by: PHYSICIAN ASSISTANT

## 2024-12-13 PROCEDURE — 94640 AIRWAY INHALATION TREATMENT: CPT

## 2024-12-13 PROCEDURE — 99285 EMERGENCY DEPT VISIT HI MDM: CPT | Mod: 25

## 2024-12-13 PROCEDURE — 84484 ASSAY OF TROPONIN QUANT: CPT | Performed by: PHYSICIAN ASSISTANT

## 2024-12-13 PROCEDURE — 85379 FIBRIN DEGRADATION QUANT: CPT | Performed by: PHYSICIAN ASSISTANT

## 2024-12-13 PROCEDURE — 71045 X-RAY EXAM CHEST 1 VIEW: CPT

## 2024-12-13 PROCEDURE — 87637 SARSCOV2&INF A&B&RSV AMP PRB: CPT | Performed by: PHYSICIAN ASSISTANT

## 2024-12-13 PROCEDURE — 82374 ASSAY BLOOD CARBON DIOXIDE: CPT | Performed by: PHYSICIAN ASSISTANT

## 2024-12-13 PROCEDURE — 93005 ELECTROCARDIOGRAM TRACING: CPT

## 2024-12-13 PROCEDURE — 84145 PROCALCITONIN (PCT): CPT | Performed by: PHYSICIAN ASSISTANT

## 2024-12-13 PROCEDURE — 85004 AUTOMATED DIFF WBC COUNT: CPT | Performed by: PHYSICIAN ASSISTANT

## 2024-12-13 PROCEDURE — 80048 BASIC METABOLIC PNL TOTAL CA: CPT | Performed by: PHYSICIAN ASSISTANT

## 2024-12-13 PROCEDURE — 83880 ASSAY OF NATRIURETIC PEPTIDE: CPT | Performed by: PHYSICIAN ASSISTANT

## 2024-12-13 PROCEDURE — 250N000009 HC RX 250: Performed by: PHYSICIAN ASSISTANT

## 2024-12-13 PROCEDURE — 96374 THER/PROPH/DIAG INJ IV PUSH: CPT

## 2024-12-13 PROCEDURE — 82805 BLOOD GASES W/O2 SATURATION: CPT | Performed by: PHYSICIAN ASSISTANT

## 2024-12-13 RX ORDER — CYCLOBENZAPRINE HCL 10 MG
10 TABLET ORAL 2 TIMES DAILY PRN
Qty: 12 TABLET | Refills: 0 | Status: SHIPPED | OUTPATIENT
Start: 2024-12-13 | End: 2024-12-19

## 2024-12-13 RX ORDER — IPRATROPIUM BROMIDE AND ALBUTEROL SULFATE 2.5; .5 MG/3ML; MG/3ML
3 SOLUTION RESPIRATORY (INHALATION) ONCE
Status: COMPLETED | OUTPATIENT
Start: 2024-12-13 | End: 2024-12-13

## 2024-12-13 RX ORDER — ALBUTEROL SULFATE 90 UG/1
2 INHALANT RESPIRATORY (INHALATION) EVERY 4 HOURS PRN
Qty: 18 G | Refills: 0 | Status: SHIPPED | OUTPATIENT
Start: 2024-12-13

## 2024-12-13 RX ORDER — METHYLPREDNISOLONE SODIUM SUCCINATE 125 MG/2ML
125 INJECTION INTRAMUSCULAR; INTRAVENOUS ONCE
Status: COMPLETED | OUTPATIENT
Start: 2024-12-13 | End: 2024-12-13

## 2024-12-13 RX ADMIN — METHYLPREDNISOLONE SODIUM SUCCINATE 125 MG: 125 INJECTION, POWDER, FOR SOLUTION INTRAMUSCULAR; INTRAVENOUS at 13:28

## 2024-12-13 RX ADMIN — IPRATROPIUM BROMIDE AND ALBUTEROL SULFATE 3 ML: .5; 3 SOLUTION RESPIRATORY (INHALATION) at 13:04

## 2024-12-13 ASSESSMENT — ACTIVITIES OF DAILY LIVING (ADL)
ADLS_ACUITY_SCORE: 41

## 2024-12-13 NOTE — DISCHARGE INSTRUCTIONS
Continue taking the medications that you were prescribed two days ago.  It is important that you continue to abstain from smoking.  You can use the muscle relaxer as needed for rib pain    Discharge Instructions  Bronchitis, Pneumonia, Bronchospasm    You were seen today for a chest infection or inflammation. If your provider decided this was due to a bacterial infection, you may need an antibiotic. Sometimes these are caused by a virus, and then an antibiotic will not help.     Generally, every Emergency Department visit should have a follow-up clinic visit with either a primary or a specialty clinic/provider. Please follow-up as instructed by your emergency provider today.    Return to the Emergency Department if:  Your breathing gets much worse.  You are very weak, or feel much more ill.  You develop new symptoms, such as chest pain.  You cough up blood.  You are vomiting (throwing up) enough that you cannot keep fluids or your medicine down.    What can I do to help myself?  Fill any prescriptions the provider gave you and take them right away--especially antibiotics. Be sure to finish the whole antibiotic prescription.  You may be given a prescription for an inhaler, which can help loosen tight air passages.  Use this as needed, but not more often than directed. Inhalers work much better when used with a spacer.   You may be given a prescription for a steroid to reduce inflammation. Used long-term, these can have side effects, but for short-term use they are safe. You may notice restlessness or increased appetite.      You may use non-prescription cough or cold medicines. Cough medicines may help, but don t make the cough go away completely.   Avoid smoke, because this can make your symptoms worse. If you smoke, this may be a good time to quit! Consider using nicotine lozenges, gum, or patches to reduce cravings.   If you have a fever, Tylenol  (acetaminophen), Motrin  (ibuprofen), or Advil  (ibuprofen) may  help bring fever down and may help you feel more comfortable. Be sure to read and follow the package directions, and ask your provider if you have questions.  Be sure to get your flu shot each year.  For certain ages, the pneumonia shot can help prevent pneumonia.  If you were given a prescription for medicine here today, be sure to read all of the information (including the package insert) that comes with your prescription.  This will include important information about the medicine, its side effects, and any warnings that you need to know about.  The pharmacist who fills the prescription can provide more information and answer questions you may have about the medicine.  If you have questions or concerns that the pharmacist cannot address, please call or return to the Emergency Department.     Remember that you can always come back to the Emergency Department if you are not able to see your regular provider in the amount of time listed above, if you get any new symptoms, or if there is anything that worries you.  Discharge Instructions  Asthma    Asthma is a condition causing narrowing and inflammation of the airways that can make it hard to breathe.  Asthma can also cause cough, wheezing, noisy breathing and tightness in the chest.  Asthma can be brought on or  triggered  by many things, including dust, mold, pollen, cigarette smoke, exercise, stress and infections (like the common cold).     Generally, every Emergency Department visit should have a follow-up clinic visit with either a primary or a specialty clinic/provider. Please follow-up as instructed by your emergency provider today.    Return to the Emergency Department if:  Your breathing gets worse.  You need to use your inhaler more often than every 4 hours, or cannot get relief from your inhaler.  You are very weak, or feel much more ill.  You develop new symptoms, such as chest pain.  You cough up blood.  You are vomiting (throwing up) enough that you  cannot keep fluids or your medicine down.    What can I do to help myself?  Fill any prescriptions the provider gave you and take them right away--especially antibiotics. Be sure to finish the whole antibiotic prescription.  You may be given a prescription for an inhaler, which can help loosen tight air passages.  Use this as needed, but not more often than directed. Inhalers work much better when used with a spacer.   You may be given a prescription for a steroid to reduce inflammation. Used long-term, these can have some side effects, but for short-term use they are safe. You may notice restlessness or increased appetite (eating more).      You may use non-prescription cough or cold medicines. Cough medicines may help, but do not make the cough go away completely.   Avoid smoke, because this can make you feel worse. If you smoke, this may be a good time to quit! Consider using nicotine lozenges, gum, or patches to reduce cravings.   If you have a fever, Tylenol  (acetaminophen), Motrin  (ibuprofen), or Advil  (ibuprofen), may help bring fever down and may help you feel more comfortable. Be sure to read and follow the package directions, and ask your provider if you have questions.  Be sure to get your flu shot each year.  For certain age groups, the pneumonia shot can help prevent pneumonia.  It is important that you follow up with your regular provider, to be sure that you are improving from this spell (an acute asthma exacerbation), and also to do what you can to keep from having trouble again. Sometimes you need long-term medicines to keep your asthma under control.   If you were given a prescription for medicine here today, be sure to read all of the information (including the package insert) that comes with your prescription.  This will include important information about the medicine, its side effects, and any warnings that you need to know about.  The pharmacist who fills the prescription can provide more  information and answer questions you may have about the medicine.  If you have questions or concerns that the pharmacist cannot address, please call or return to the Emergency Department.   Remember that you can always come back to the Emergency Department if you are not able to see your regular provider in the amount of time listed above, if you get any new symptoms, or if there is anything that worries you.

## 2024-12-13 NOTE — LETTER
Carlotta Arce was seen and treated in our emergency department on 12/13/2024.  She may return to work on 12/14/2024.      If you have any questions or concerns, please don't hesitate to call.      Lakshmi Jones RN

## 2024-12-13 NOTE — ED TRIAGE NOTES
Arrives via EMS from work. [facility] she was feeling sick for a couple of day, with bronchitis. [facility] was sent home with prednisone and inhaler.     EMS called due to CP. [facility] was given neb en route pain improved.     324 ASA. Nitroglycerin X1.     20 gauge L hand.     EKG normal. VSS on RA.

## 2024-12-13 NOTE — Clinical Note
Carlottajaziel Arce was seen and treated in our emergency department on 12/13/2024.  She may return to work on [unfilled].  [unfilled]     If you have any questions or concerns, please don't hesitate to call.      [unfilled]

## 2024-12-14 NOTE — ED PROVIDER NOTES
Emergency Department Note      History of Present Illness     Chief Complaint   Chest Pain      HPI   Carlotta Arce is a 59 year old female with a history of asthma and tobacco use who presents with chest tightness cough shortness of breath.  The patient has been dealing with cough for the past couple of days.  She was seen and diagnosed with bronchitis and started on prednisone doxycycline and albuterol.  She notes feeling somewhat better though woke up today with tightness in the chest that has persisted.  She was given aspirin and nitroglycerin by EMS that did not help but states that the nebulizer helped.  She denies hemoptysis or leg swelling.  She does note she had a blood clot before but is not on thinners.  No fevers.  No history of CAD.    Independent Historian   EMS as detailed above.  They state they gave the pt a neb w/improvement in breathing and symptoms  Review of External Notes   Reviewed Eva CERVANTES note from 24 where pt seen and diagnosed w/lower respiratory tract infection and asthma exacerbation.  No imaging.  Started on albutero, doxycycline and prednisone 40 mg daily    Past Medical History     Medical History and Problem List   Past Medical History:   Diagnosis Date    Kyphoscoliosis and scoliosis     Moderate persistent asthma        Medications   albuterol (PROAIR HFA/PROVENTIL HFA/VENTOLIN HFA) 108 (90 Base) MCG/ACT inhaler  budesonide-formoterol (SYMBICORT) 80-4.5 MCG/ACT Inhaler  cyclobenzaprine (FLEXERIL) 10 MG tablet  dexamethasone (DECADRON) 4 MG tablet  fluticasone-salmeterol (ADVAIR DISKUS) 250-50 MCG/DOSE inhaler  ipratropium - albuterol 0.5 mg/2.5 mg/3 mL (DUONEB) 0.5-2.5 (3) MG/3ML neb solution  nicotine (NICOTROL) 10 MG inhaler        Surgical History   Past Surgical History:   Procedure Laterality Date     SECTION      x3    CHOLECYSTECTOMY, LAPOROSCOPIC      TUBAL LIGATION      Became pregnant after this first tubal ligation    TUBAL  LIGATION  1997    Repeat       Physical Exam     Patient Vitals for the past 24 hrs:   BP Temp Temp src Pulse Resp SpO2   12/13/24 1250 -- -- -- -- -- 98 %   12/13/24 1239 -- 98  F (36.7  C) Oral -- 28 97 %   12/13/24 1236 -- -- -- -- -- 100 %   12/13/24 1233 127/57 -- -- 88 -- 99 %     Physical Exam  General: Awake, alert, non-toxic.  Head:  Scalp is NC/AT  Eyes:  Conjunctiva normal, PERRL  ENT:  The external nose and ears are normal.     Oropharynx clear, uvula midline.  Neck:  Normal range of motion without rigidity.  CV:  Regular rate and rhythm    No pathologic murmur, rubs, or gallops.  Resp:  Intially diffuse end-expiratory wheezing.  Mildly tachypnic.  No retractions or accessory muscle use. Good air movement.  No stridor.  Abdomen: Abdomen is soft, no distension, no tenderness, no masses. No CVA tenderness.  MS:  No lower extremity edema/swelling.   Skin:  Warm and dry, No rash or lesions noted.  Neuro:  Alert and oriented.  GCS 15 Moves all extremities normal.  No facial asymmetry. Gait normal.  Psych:  Awake. Alert. Normal affect. Appropriate interactions.      Diagnostics     Lab Results   Labs Ordered and Resulted from Time of ED Arrival to Time of ED Departure   BLOOD GAS VENOUS - Abnormal       Result Value    pH Venous 7.38      pCO2 Venous 44      pO2 Venous 38      Bicarbonate Venous 26      Base Excess/Deficit Venous 0.8      FIO2 21      Oxyhemoglobin Venous 68 (*)     O2 Sat, Venous 70.0     BASIC METABOLIC PANEL - Abnormal    Sodium 136      Potassium 3.8      Chloride 102      Carbon Dioxide (CO2) 23      Anion Gap 11      Urea Nitrogen 19.6      Creatinine 0.48 (*)     GFR Estimate >90      Calcium 8.8      Glucose 114 (*)    CBC WITH PLATELETS AND DIFFERENTIAL - Abnormal    WBC Count 7.0      RBC Count 4.51      Hemoglobin 10.2 (*)     Hematocrit 33.4 (*)     MCV 74 (*)     MCH 22.6 (*)     MCHC 30.5 (*)     RDW 19.1 (*)     Platelet Count 443      % Neutrophils 63      % Lymphocytes 27       % Monocytes 7      % Eosinophils 1      % Basophils 1      % Immature Granulocytes 0      NRBCs per 100 WBC 0      Absolute Neutrophils 4.4      Absolute Lymphocytes 1.9      Absolute Monocytes 0.5      Absolute Eosinophils 0.1      Absolute Basophils 0.1      Absolute Immature Granulocytes 0.0      Absolute NRBCs 0.0     TROPONIN T, HIGH SENSITIVITY - Normal    Troponin T, High Sensitivity <6     NT PROBNP INPATIENT - Normal    N terminal Pro BNP Inpatient <36     D DIMER QUANTITATIVE - Normal    D-Dimer Quantitative <0.27     INFLUENZA A/B, RSV AND SARS-COV2 PCR - Normal    Influenza A PCR Negative      Influenza B PCR Negative      RSV PCR Negative      SARS CoV2 PCR Negative     PROCALCITONIN - Normal    Procalcitonin 0.05         Imaging   XR Chest Port 1 View   Final Result   IMPRESSION: Stable size of cardiomediastinal silhouette. Moderate   hiatal hernia is again noted. No focal airspace consolidation, pleural   effusion or pneumothorax. No acute bony abnormality. Thoracolumbar   scoliosis. Prior cholecystectomy.      CANDACE BEY MD            SYSTEM ID:  [id number]          ECG results from 12/13/24   EKG 12 lead     Value    Systolic Blood Pressure     Diastolic Blood Pressure     Ventricular Rate 74    Atrial Rate 74    AZ Interval 146    QRS Duration 82        QTc 426    P Axis 74    R AXIS 59    T Axis 70    Interpretation ECG      Sinus rhythm  Normal ECG  When compared with ECG of 21-May-2024 19:30,  No significant change was found  Unconfirmed report - interpretation of this ECG is computer generated - see medical record for final interpretation  Confirmed by - EMERGENCY ROOM, PHYSICIAN ([id number]),  Mo Montilla ([id number]) on 12/13/2024 2:39:01 PM         Independent Interpretation   CXR: No pneumothorax, infiltrate, pleural effusion, pulmonary edema, cardiomegaly, or mediastinal widening.    ED Course      Medications Administered   Medications   ipratropium - albuterol 0.5 mg/2.5 mg/3 mL  (DUONEB) neb solution 3 mL (3 mLs Nebulization $Given 12/13/24 1304)   ipratropium - albuterol 0.5 mg/2.5 mg/3 mL (DUONEB) neb solution 3 mL (3 mLs Nebulization $Given 12/13/24 1304)   methylPREDNISolone Na Suc (solu-MEDROL) injection 125 mg (125 mg Intravenous $Given 12/13/24 1328)       Procedures   Procedures     Discussion of Management   None    ED Course    I peformed an eval of the pt as above  I re-checked the pt.  She feels improved and wants to go home    Additional Documentation  None    Medical Decision Making / Diagnosis     CMS Diagnoses: None    MIPS       None    MDM   59 year old female who presents with chest pain.  Patient history and records reviewed. Broad differential considered including: ACS, PE, aortic dissection, myocarditis, pericarditis, pneumothorax, pneumonia, esophageal rupture, musculoskeletal chest wall pain, referred pain from abdomen.  Patient well appearing with non-concerning vitals.  EKG without evidence of acute ischemia, signs of pericarditis, or arrythmia. HS troponin is undetectable, and patient is a HEART score of 2 ([id number]) and I feel ACS or myocarditis is unlikely.  D dimer negative and low risk for PE by Well's criteria and would not pursue further workup at this time.  Chest x-ray shows no evidence of pneumonia, free air, pneumothorax, CHF, or mediastinal widening.  No other high risk factors present to suggest aortic dissection, and feel this is very unlikely at this time.  Abdominal examination non-tender and doubt referred pain from intra-abdominal catastrophe, pancreatitis, gallbladder pathology.    Presentation is consistent with asthma exacerbation likely secondary to viral URI with ongoing tobacco use.  She is not hypoxic or hypercapnic much improved after DuoNebs and steroids here.  Chest x-ray clear no evidence of bacterial infection Pro-Neto low further supporting this.  Viral swab is negative.  Already on doxycycline and prednisone reasonable to continue  this.  Her albuterol inhaler that she was using at home is actually empty this is likely consistent with part of what is causing her to fail at home I did write a new prescription for this.  Urged smoking cessation.  I do not think we need to broaden her antibiotics.  Close outpatient follow-up with PCP and return for new worsening or changing symptoms muscle relaxer given for chest pain which I suspect is musculoskeletal in nature and related to her bronchitis/asthma.      Disposition   The patient was discharged.     Diagnosis     ICD-10-CM    1. Asthma exacerbation  J45.901       2. Acute bronchitis  J20.9       3. Atypical chest pain  R07.89            Discharge Medications   Discharge Medication List as of 12/13/2024  2:59 PM               Odell Reddy PA-C  12/13/24 0095

## 2025-03-09 ENCOUNTER — HOSPITAL ENCOUNTER (EMERGENCY)
Facility: CLINIC | Age: 60
Discharge: HOME | End: 2025-03-09
Attending: EMERGENCY MEDICINE
Payer: COMMERCIAL

## 2025-03-09 ENCOUNTER — APPOINTMENT (OUTPATIENT)
Dept: GENERAL RADIOLOGY | Facility: CLINIC | Age: 60
End: 2025-03-09
Attending: EMERGENCY MEDICINE
Payer: COMMERCIAL

## 2025-03-09 VITALS
HEIGHT: 59 IN | OXYGEN SATURATION: 97 % | RESPIRATION RATE: 18 BRPM | DIASTOLIC BLOOD PRESSURE: 88 MMHG | SYSTOLIC BLOOD PRESSURE: 160 MMHG | BODY MASS INDEX: 28.36 KG/M2 | HEART RATE: 89 BPM | TEMPERATURE: 97.6 F | WEIGHT: 140.65 LBS

## 2025-03-09 DIAGNOSIS — J44.1 COPD EXACERBATION (H): ICD-10-CM

## 2025-03-09 LAB
ANION GAP SERPL CALCULATED.3IONS-SCNC: 13 MMOL/L (ref 7–15)
BASOPHILS # BLD AUTO: 0.1 10E3/UL (ref 0–0.2)
BASOPHILS NFR BLD AUTO: 1 %
BUN SERPL-MCNC: 17.2 MG/DL (ref 6–20)
CALCIUM SERPL-MCNC: 9.5 MG/DL (ref 8.8–10.4)
CHLORIDE SERPL-SCNC: 97 MMOL/L (ref 98–107)
CREAT SERPL-MCNC: 0.42 MG/DL (ref 0.51–0.95)
EGFRCR SERPLBLD CKD-EPI 2021: >90 ML/MIN/1.73M2
EOSINOPHIL # BLD AUTO: 0 10E3/UL (ref 0–0.7)
EOSINOPHIL NFR BLD AUTO: 0 %
ERYTHROCYTE [DISTWIDTH] IN BLOOD BY AUTOMATED COUNT: 18.6 % (ref 10–15)
FLUAV RNA SPEC QL NAA+PROBE: NEGATIVE
FLUBV RNA RESP QL NAA+PROBE: NEGATIVE
GLUCOSE SERPL-MCNC: 111 MG/DL (ref 70–99)
HCO3 SERPL-SCNC: 27 MMOL/L (ref 22–29)
HCT VFR BLD AUTO: 35.2 % (ref 35–47)
HGB BLD-MCNC: 10.6 G/DL (ref 11.7–15.7)
HOLD SPECIMEN: NORMAL
HOLD SPECIMEN: NORMAL
IMM GRANULOCYTES # BLD: 0 10E3/UL
IMM GRANULOCYTES NFR BLD: 1 %
LYMPHOCYTES # BLD AUTO: 1.3 10E3/UL (ref 0.8–5.3)
LYMPHOCYTES NFR BLD AUTO: 16 %
MCH RBC QN AUTO: 21.5 PG (ref 26.5–33)
MCHC RBC AUTO-ENTMCNC: 30.1 G/DL (ref 31.5–36.5)
MCV RBC AUTO: 72 FL (ref 78–100)
MONOCYTES # BLD AUTO: 0.5 10E3/UL (ref 0–1.3)
MONOCYTES NFR BLD AUTO: 6 %
NEUTROPHILS # BLD AUTO: 6.3 10E3/UL (ref 1.6–8.3)
NEUTROPHILS NFR BLD AUTO: 77 %
NRBC # BLD AUTO: 0 10E3/UL
NRBC BLD AUTO-RTO: 0 /100
PLATELET # BLD AUTO: 418 10E3/UL (ref 150–450)
POTASSIUM SERPL-SCNC: 4 MMOL/L (ref 3.4–5.3)
RBC # BLD AUTO: 4.92 10E6/UL (ref 3.8–5.2)
RSV RNA SPEC NAA+PROBE: NEGATIVE
SARS-COV-2 RNA RESP QL NAA+PROBE: NEGATIVE
SODIUM SERPL-SCNC: 137 MMOL/L (ref 135–145)
TROPONIN T SERPL HS-MCNC: <6 NG/L
WBC # BLD AUTO: 8.2 10E3/UL (ref 4–11)

## 2025-03-09 PROCEDURE — 93005 ELECTROCARDIOGRAM TRACING: CPT

## 2025-03-09 PROCEDURE — 99285 EMERGENCY DEPT VISIT HI MDM: CPT | Mod: 25

## 2025-03-09 PROCEDURE — 87637 SARSCOV2&INF A&B&RSV AMP PRB: CPT | Performed by: EMERGENCY MEDICINE

## 2025-03-09 PROCEDURE — 84484 ASSAY OF TROPONIN QUANT: CPT | Performed by: EMERGENCY MEDICINE

## 2025-03-09 PROCEDURE — 94640 AIRWAY INHALATION TREATMENT: CPT

## 2025-03-09 PROCEDURE — 82374 ASSAY BLOOD CARBON DIOXIDE: CPT | Performed by: EMERGENCY MEDICINE

## 2025-03-09 PROCEDURE — 250N000011 HC RX IP 250 OP 636: Mod: JZ | Performed by: EMERGENCY MEDICINE

## 2025-03-09 PROCEDURE — 250N000009 HC RX 250: Performed by: EMERGENCY MEDICINE

## 2025-03-09 PROCEDURE — 71046 X-RAY EXAM CHEST 2 VIEWS: CPT

## 2025-03-09 PROCEDURE — 80048 BASIC METABOLIC PNL TOTAL CA: CPT | Performed by: EMERGENCY MEDICINE

## 2025-03-09 PROCEDURE — 36415 COLL VENOUS BLD VENIPUNCTURE: CPT | Performed by: EMERGENCY MEDICINE

## 2025-03-09 PROCEDURE — 96374 THER/PROPH/DIAG INJ IV PUSH: CPT

## 2025-03-09 PROCEDURE — 85025 COMPLETE CBC W/AUTO DIFF WBC: CPT | Performed by: EMERGENCY MEDICINE

## 2025-03-09 RX ORDER — ALBUTEROL SULFATE 90 UG/1
2 INHALANT RESPIRATORY (INHALATION) EVERY 6 HOURS PRN
Qty: 18 G | Refills: 0 | Status: SHIPPED | OUTPATIENT
Start: 2025-03-09

## 2025-03-09 RX ORDER — METHYLPREDNISOLONE SODIUM SUCCINATE 125 MG/2ML
125 INJECTION INTRAMUSCULAR; INTRAVENOUS ONCE
Status: COMPLETED | OUTPATIENT
Start: 2025-03-09 | End: 2025-03-09

## 2025-03-09 RX ORDER — PREDNISONE 20 MG/1
40 TABLET ORAL DAILY
Qty: 8 TABLET | Refills: 0 | Status: SHIPPED | OUTPATIENT
Start: 2025-03-09 | End: 2025-03-13

## 2025-03-09 RX ORDER — IPRATROPIUM BROMIDE AND ALBUTEROL SULFATE 2.5; .5 MG/3ML; MG/3ML
6 SOLUTION RESPIRATORY (INHALATION) ONCE
Status: COMPLETED | OUTPATIENT
Start: 2025-03-09 | End: 2025-03-09

## 2025-03-09 RX ORDER — IPRATROPIUM BROMIDE AND ALBUTEROL SULFATE 2.5; .5 MG/3ML; MG/3ML
3 SOLUTION RESPIRATORY (INHALATION) ONCE
Status: COMPLETED | OUTPATIENT
Start: 2025-03-09 | End: 2025-03-09

## 2025-03-09 RX ORDER — IPRATROPIUM BROMIDE AND ALBUTEROL SULFATE 2.5; .5 MG/3ML; MG/3ML
1 SOLUTION RESPIRATORY (INHALATION) EVERY 6 HOURS PRN
Qty: 90 ML | Refills: 0 | Status: SHIPPED | OUTPATIENT
Start: 2025-03-09

## 2025-03-09 RX ORDER — AZITHROMYCIN 250 MG/1
TABLET, FILM COATED ORAL
Qty: 6 TABLET | Refills: 0 | Status: SHIPPED | OUTPATIENT
Start: 2025-03-09 | End: 2025-03-14

## 2025-03-09 RX ADMIN — METHYLPREDNISOLONE SODIUM SUCCINATE 125 MG: 125 INJECTION, POWDER, FOR SOLUTION INTRAMUSCULAR; INTRAVENOUS at 19:44

## 2025-03-09 RX ADMIN — IPRATROPIUM BROMIDE AND ALBUTEROL SULFATE 6 ML: .5; 3 SOLUTION RESPIRATORY (INHALATION) at 19:36

## 2025-03-09 RX ADMIN — IPRATROPIUM BROMIDE AND ALBUTEROL SULFATE 3 ML: .5; 3 SOLUTION RESPIRATORY (INHALATION) at 21:26

## 2025-03-09 ASSESSMENT — ACTIVITIES OF DAILY LIVING (ADL)
ADLS_ACUITY_SCORE: 41

## 2025-03-09 NOTE — ED TRIAGE NOTES
Pt coming in from work. Reports having SOB & CP. Pt reports having asthma attack. Took her inhaler a few hours ago. Still feeling SOB. VSS. A&O. Appears labored in triage w/ productive cough.

## 2025-03-10 LAB
ATRIAL RATE - MUSE: 99 BPM
DIASTOLIC BLOOD PRESSURE - MUSE: NORMAL MMHG
INTERPRETATION ECG - MUSE: NORMAL
P AXIS - MUSE: 76 DEGREES
PR INTERVAL - MUSE: 138 MS
QRS DURATION - MUSE: 86 MS
QT - MUSE: 326 MS
QTC - MUSE: 418 MS
R AXIS - MUSE: 64 DEGREES
SYSTOLIC BLOOD PRESSURE - MUSE: NORMAL MMHG
T AXIS - MUSE: 77 DEGREES
VENTRICULAR RATE- MUSE: 99 BPM

## 2025-03-14 NOTE — ED PROVIDER NOTES
Emergency Department Note      History of Present Illness     Chief Complaint   Shortness of Breath (/) and Chest Pain      HPI   Carlotta Arce is a 59 year old female with past medical history COPD/asthma who presents with chief complaint dyspnea/chest pain.  Patient reports symptoms began in the last 24 to 48 hours.  She reports her inhaler has not been adequate at controlling her symptoms.  She was at work when symptoms became severe and her boss sent her to the emergency department for further evaluation.  She denies recent fever.  She reports her pain is more tightness in her chest.  She reports she is still smoking.    Independent Historian   None    Review of External Notes   none    Past Medical History     Medical History and Problem List   Past Medical History:   Diagnosis Date    Kyphoscoliosis and scoliosis     Moderate persistent asthma        Medications   albuterol (PROAIR HFA/PROVENTIL HFA/VENTOLIN HFA) 108 (90 Base) MCG/ACT inhaler  azithromycin (ZITHROMAX) 250 MG tablet  ipratropium - albuterol 0.5 mg/2.5 mg/3 mL (DUONEB) 0.5-2.5 (3) MG/3ML neb solution  predniSONE (DELTASONE) 20 MG tablet  albuterol (PROAIR HFA/PROVENTIL HFA/VENTOLIN HFA) 108 (90 Base) MCG/ACT inhaler  budesonide-formoterol (SYMBICORT) 80-4.5 MCG/ACT Inhaler  dexamethasone (DECADRON) 4 MG tablet  fluticasone-salmeterol (ADVAIR DISKUS) 250-50 MCG/DOSE inhaler  ipratropium - albuterol 0.5 mg/2.5 mg/3 mL (DUONEB) 0.5-2.5 (3) MG/3ML neb solution  nicotine (NICOTROL) 10 MG inhaler        Surgical History   Past Surgical History:   Procedure Laterality Date     SECTION      x3    CHOLECYSTECTOMY, LAPOROSCOPIC      TUBAL LIGATION      Became pregnant after this first tubal ligation    TUBAL LIGATION      Repeat       Physical Exam       Physical Exam  Nursing note and vitals reviewed.  HENT:   Mouth/Throat: Moist mucous membranes.   Eyes: EOMI, nonicteric sclera  Cardiovascular: Normal rate, regular  rhythm, no murmurs, rubs, or gallops  Pulmonary/Chest: Mild tachypnea, bilateral wheezing noted.  No asymmetric sounds.  Abdominal: Soft. Nontender, nondistended, no guarding or rigidity.   Musculoskeletal: Normal range of motion.   Neurological: Alert. Moves all extremities spontaneously.   Skin: Skin is warm and dry. No rash noted.         Diagnostics     Lab Results   Labs Ordered and Resulted from Time of ED Arrival to Time of ED Departure   BASIC METABOLIC PANEL - Abnormal       Result Value    Sodium 137      Potassium 4.0      Chloride 97 (*)     Carbon Dioxide (CO2) 27      Anion Gap 13      Urea Nitrogen 17.2      Creatinine 0.42 (*)     GFR Estimate >90      Calcium 9.5      Glucose 111 (*)    CBC WITH PLATELETS AND DIFFERENTIAL - Abnormal    WBC Count 8.2      RBC Count 4.92      Hemoglobin 10.6 (*)     Hematocrit 35.2      MCV 72 (*)     MCH 21.5 (*)     MCHC 30.1 (*)     RDW 18.6 (*)     Platelet Count 418      % Neutrophils 77      % Lymphocytes 16      % Monocytes 6      % Eosinophils 0      % Basophils 1      % Immature Granulocytes 1      NRBCs per 100 WBC 0      Absolute Neutrophils 6.3      Absolute Lymphocytes 1.3      Absolute Monocytes 0.5      Absolute Eosinophils 0.0      Absolute Basophils 0.1      Absolute Immature Granulocytes 0.0      Absolute NRBCs 0.0     TROPONIN T, HIGH SENSITIVITY - Normal    Troponin T, High Sensitivity <6     INFLUENZA A/B, RSV AND SARS-COV2 PCR - Normal    Influenza A PCR Negative      Influenza B PCR Negative      RSV PCR Negative      SARS CoV2 PCR Negative         Imaging   Chest XR,  PA & LAT   Final Result   IMPRESSION: Stable size of cardiomediastinal silhouette. Small hiatal hernia is again noted. No focal airspace consolidation, pleural effusion or pneumothorax. No acute bony abnormality.          EKG   ECG results from 03/09/25   EKG 12 lead     Value    Systolic Blood Pressure     Diastolic Blood Pressure     Ventricular Rate 99    Atrial Rate 99    DC  Interval 138    QRS Duration 86        QTc 418    P Axis 76    R AXIS 64    T Axis 77    Interpretation ECG      Sinus rhythm  Possible Left atrial enlargement  Borderline ECG  When compared with ECG of 13-Dec-2024 12:39,  No significant change was found           Independent Interpretation   I independently reviewed the cxr. I see no evidence of ptx/opacity.       ED Course      Medications Administered   Medications   ipratropium - albuterol 0.5 mg/2.5 mg/3 mL (DUONEB) neb solution 6 mL (6 mLs Nebulization $Given 3/9/25 1936)   methylPREDNISolone Na Suc (solu-MEDROL) injection 125 mg (125 mg Intravenous $Given 3/9/25 1944)   ipratropium - albuterol 0.5 mg/2.5 mg/3 mL (DUONEB) neb solution 3 mL (3 mLs Nebulization $Given 3/9/25 2126)       Procedures   Procedures     Discussion of Management   None    ED Course   The patient arrived in triage where vitals were measured and recorded.   The patient was then escorted back to the emergency department.   The patient's medical records were reviewed.  Nursing notes and vitals were reviewed.    I performed an exam of the patient as documented above. The patient is in agreement with my plan of care.       Additional Documentation  None    Medical Decision Making / Diagnosis     CMS Diagnoses: None    MIPS       None    MDM   Carlotta Arce is a 59 year old female with a history of asthma/COPD who presents for evaluation of shortness of breath.   Signs and symptoms are consistent with COPD exacerbation.  A broad differential was considered including foreign body, COPD, viral induced reactive airway disease, pneumothorax, cardiac equivalent, allergic phenomena, pneumonia, bronchitis, etc. Presentation is consistent with copd/asthma exacerbation. There are no signs at this point of any other serious etiologies including those mentioned above, especially acute coronary syndrome. I doubt this is ACS given the classic story of COPD exacerbation given by the  patient, the marked wheezing without rales and a nonischemic EKG. No signs of pneumonia by CXR.   Patient feels improved after interventions here in ED.   No indication for hospitalization at this time including no hypoxia, no marked increase in respiratory rate, minimal to no retractions.   Supportive outpatient management is indicated, medications for discharge noted below.  Close followup with primary care physician.  Return if increased wheezing, progressive shortness of breath, develops fever greater than 102.     Disposition   The patient was discharged.     Diagnosis     ICD-10-CM    1. COPD exacerbation (H)  J44.1            Discharge Medications   Discharge Medication List as of 3/9/2025 10:07 PM        START taking these medications    Details   !! albuterol (PROAIR HFA/PROVENTIL HFA/VENTOLIN HFA) 108 (90 Base) MCG/ACT inhaler Inhale 2 puffs into the lungs every 6 hours as needed for shortness of breath, wheezing or cough., Disp-18 g, R-0, E-PrescribePharmacy may dispense brand covered by insurance (Proair, or proventil or ventolin or generic albuterol inhaler)      azithromycin (ZITHROMAX) 250 MG tablet Take 2 tablets (500 mg) by mouth daily for 1 day, THEN 1 tablet (250 mg) daily for 4 days., Disp-6 tablet, R-0, E-Prescribe      !! ipratropium - albuterol 0.5 mg/2.5 mg/3 mL (DUONEB) 0.5-2.5 (3) MG/3ML neb solution Take 1 vial (3 mLs) by nebulization every 6 hours as needed for shortness of breath, wheezing or cough., Disp-90 mL, R-0, E-Prescribe      predniSONE (DELTASONE) 20 MG tablet Take 2 tablets (40 mg) by mouth daily for 4 days., Disp-8 tablet, R-0, E-Prescribe       !! - Potential duplicate medications found. Please discuss with provider.                 Lito Palacios MD  03/13/25 [id number]

## (undated) RX ORDER — IPRATROPIUM BROMIDE AND ALBUTEROL SULFATE 2.5; .5 MG/3ML; MG/3ML
SOLUTION RESPIRATORY (INHALATION)
Status: DISPENSED
Start: 2019-05-06

## (undated) RX ORDER — IPRATROPIUM BROMIDE AND ALBUTEROL SULFATE 2.5; .5 MG/3ML; MG/3ML
SOLUTION RESPIRATORY (INHALATION)
Status: DISPENSED
Start: 2019-11-08